# Patient Record
Sex: MALE | Race: WHITE | Employment: FULL TIME | URBAN - METROPOLITAN AREA
[De-identification: names, ages, dates, MRNs, and addresses within clinical notes are randomized per-mention and may not be internally consistent; named-entity substitution may affect disease eponyms.]

---

## 2021-07-06 ENCOUNTER — APPOINTMENT (OUTPATIENT)
Dept: GENERAL RADIOLOGY | Age: 61
DRG: 247 | End: 2021-07-06
Attending: EMERGENCY MEDICINE
Payer: COMMERCIAL

## 2021-07-06 ENCOUNTER — HOSPITAL ENCOUNTER (INPATIENT)
Age: 61
LOS: 2 days | Discharge: HOME | DRG: 247 | End: 2021-07-08
Attending: EMERGENCY MEDICINE | Admitting: FAMILY MEDICINE
Payer: COMMERCIAL

## 2021-07-06 DIAGNOSIS — I24.9 ACS (ACUTE CORONARY SYNDROME) (HCC): Primary | ICD-10-CM

## 2021-07-06 DIAGNOSIS — I21.3 ST ELEVATION (STEMI) MYOCARDIAL INFARCTION (HCC): ICD-10-CM

## 2021-07-06 DIAGNOSIS — I21.3 ST ELEVATION MYOCARDIAL INFARCTION (STEMI), UNSPECIFIED ARTERY (HCC): ICD-10-CM

## 2021-07-06 LAB
ALBUMIN SERPL-MCNC: 4.1 G/DL (ref 3.4–5)
ALBUMIN/GLOB SERPL: 1.1 {RATIO} (ref 0.8–1.7)
ALP SERPL-CCNC: 102 U/L (ref 45–117)
ALT SERPL-CCNC: 108 U/L (ref 16–61)
ANION GAP SERPL CALC-SCNC: 12 MMOL/L (ref 3–18)
APTT PPP: 27 SEC (ref 23–36.4)
AST SERPL-CCNC: 47 U/L (ref 10–38)
ATRIAL RATE: 75 BPM
BASOPHILS # BLD: 0 K/UL (ref 0–0.1)
BASOPHILS NFR BLD: 1 % (ref 0–2)
BILIRUB SERPL-MCNC: 0.4 MG/DL (ref 0.2–1)
BNP SERPL-MCNC: 47 PG/ML (ref 0–900)
BUN SERPL-MCNC: 23 MG/DL (ref 7–18)
BUN/CREAT SERPL: 16 (ref 12–20)
CALCIUM SERPL-MCNC: 9.2 MG/DL (ref 8.5–10.1)
CALCULATED P AXIS, ECG09: 6 DEGREES
CALCULATED R AXIS, ECG10: 8 DEGREES
CALCULATED T AXIS, ECG11: -49 DEGREES
CHLORIDE SERPL-SCNC: 102 MMOL/L (ref 100–111)
CK MB CFR SERPL CALC: 1.3 % (ref 0–4)
CK MB SERPL-MCNC: 2.7 NG/ML (ref 5–25)
CK SERPL-CCNC: 215 U/L (ref 39–308)
CO2 SERPL-SCNC: 20 MMOL/L (ref 21–32)
CREAT SERPL-MCNC: 1.44 MG/DL (ref 0.6–1.3)
DIAGNOSIS, 93000: NORMAL
DIFFERENTIAL METHOD BLD: NORMAL
EOSINOPHIL # BLD: 0.2 K/UL (ref 0–0.4)
EOSINOPHIL NFR BLD: 2 % (ref 0–5)
ERYTHROCYTE [DISTWIDTH] IN BLOOD BY AUTOMATED COUNT: 12.3 % (ref 11.6–14.5)
EST. AVERAGE GLUCOSE BLD GHB EST-MCNC: 151 MG/DL
GLOBULIN SER CALC-MCNC: 3.7 G/DL (ref 2–4)
GLUCOSE BLD STRIP.AUTO-MCNC: 134 MG/DL (ref 70–110)
GLUCOSE BLD STRIP.AUTO-MCNC: 136 MG/DL (ref 70–110)
GLUCOSE BLD STRIP.AUTO-MCNC: 177 MG/DL (ref 70–110)
GLUCOSE BLD STRIP.AUTO-MCNC: 198 MG/DL (ref 70–110)
GLUCOSE SERPL-MCNC: 242 MG/DL (ref 74–99)
HBA1C MFR BLD: 6.9 % (ref 4.2–5.6)
HCT VFR BLD AUTO: 42.8 % (ref 36–48)
HGB BLD-MCNC: 14.8 G/DL (ref 13–16)
INR PPP: 1 (ref 0.8–1.2)
LYMPHOCYTES # BLD: 1.6 K/UL (ref 0.9–3.6)
LYMPHOCYTES NFR BLD: 23 % (ref 21–52)
MAGNESIUM SERPL-MCNC: 2.1 MG/DL (ref 1.6–2.6)
MCH RBC QN AUTO: 31.8 PG (ref 24–34)
MCHC RBC AUTO-ENTMCNC: 34.6 G/DL (ref 31–37)
MCV RBC AUTO: 91.8 FL (ref 74–97)
MONOCYTES # BLD: 0.6 K/UL (ref 0.05–1.2)
MONOCYTES NFR BLD: 9 % (ref 3–10)
NEUTS SEG # BLD: 4.5 K/UL (ref 1.8–8)
NEUTS SEG NFR BLD: 65 % (ref 40–73)
P-R INTERVAL, ECG05: 146 MS
PLATELET # BLD AUTO: 242 K/UL (ref 135–420)
PMV BLD AUTO: 9.7 FL (ref 9.2–11.8)
POTASSIUM SERPL-SCNC: 3.9 MMOL/L (ref 3.5–5.5)
PROT SERPL-MCNC: 7.8 G/DL (ref 6.4–8.2)
PROTHROMBIN TIME: 12.7 SEC (ref 11.5–15.2)
Q-T INTERVAL, ECG07: 396 MS
QRS DURATION, ECG06: 90 MS
QTC CALCULATION (BEZET), ECG08: 442 MS
RBC # BLD AUTO: 4.66 M/UL (ref 4.35–5.65)
SODIUM SERPL-SCNC: 134 MMOL/L (ref 136–145)
TROPONIN I SERPL-MCNC: 0.1 NG/ML (ref 0–0.04)
VENTRICULAR RATE, ECG03: 75 BPM
WBC # BLD AUTO: 7 K/UL (ref 4.6–13.2)

## 2021-07-06 PROCEDURE — 92928 PRQ TCAT PLMT NTRAC ST 1 LES: CPT | Performed by: INTERNAL MEDICINE

## 2021-07-06 PROCEDURE — 96374 THER/PROPH/DIAG INJ IV PUSH: CPT

## 2021-07-06 PROCEDURE — C1894 INTRO/SHEATH, NON-LASER: HCPCS | Performed by: INTERNAL MEDICINE

## 2021-07-06 PROCEDURE — 65660000004 HC RM CVT STEPDOWN

## 2021-07-06 PROCEDURE — 74011250636 HC RX REV CODE- 250/636: Performed by: EMERGENCY MEDICINE

## 2021-07-06 PROCEDURE — C1887 CATHETER, GUIDING: HCPCS | Performed by: INTERNAL MEDICINE

## 2021-07-06 PROCEDURE — 83735 ASSAY OF MAGNESIUM: CPT

## 2021-07-06 PROCEDURE — B2111ZZ FLUOROSCOPY OF MULTIPLE CORONARY ARTERIES USING LOW OSMOLAR CONTRAST: ICD-10-PCS | Performed by: INTERNAL MEDICINE

## 2021-07-06 PROCEDURE — 2709999900 HC NON-CHARGEABLE SUPPLY

## 2021-07-06 PROCEDURE — 99223 1ST HOSP IP/OBS HIGH 75: CPT | Performed by: INTERNAL MEDICINE

## 2021-07-06 PROCEDURE — 82553 CREATINE MB FRACTION: CPT

## 2021-07-06 PROCEDURE — 85610 PROTHROMBIN TIME: CPT

## 2021-07-06 PROCEDURE — 99153 MOD SED SAME PHYS/QHP EA: CPT | Performed by: INTERNAL MEDICINE

## 2021-07-06 PROCEDURE — 4A023N7 MEASUREMENT OF CARDIAC SAMPLING AND PRESSURE, LEFT HEART, PERCUTANEOUS APPROACH: ICD-10-PCS | Performed by: INTERNAL MEDICINE

## 2021-07-06 PROCEDURE — 83880 ASSAY OF NATRIURETIC PEPTIDE: CPT

## 2021-07-06 PROCEDURE — 74011250636 HC RX REV CODE- 250/636: Performed by: INTERNAL MEDICINE

## 2021-07-06 PROCEDURE — C1769 GUIDE WIRE: HCPCS | Performed by: INTERNAL MEDICINE

## 2021-07-06 PROCEDURE — 74011000636 HC RX REV CODE- 636: Performed by: INTERNAL MEDICINE

## 2021-07-06 PROCEDURE — B2151ZZ FLUOROSCOPY OF LEFT HEART USING LOW OSMOLAR CONTRAST: ICD-10-PCS | Performed by: INTERNAL MEDICINE

## 2021-07-06 PROCEDURE — 77030013519 HC DEV INFL BASIX MRTM -B: Performed by: INTERNAL MEDICINE

## 2021-07-06 PROCEDURE — 92941 PRQ TRLML REVSC TOT OCCL AMI: CPT | Performed by: INTERNAL MEDICINE

## 2021-07-06 PROCEDURE — 83036 HEMOGLOBIN GLYCOSYLATED A1C: CPT

## 2021-07-06 PROCEDURE — 77030013797 HC KT TRNSDUC PRSSR EDWD -A: Performed by: INTERNAL MEDICINE

## 2021-07-06 PROCEDURE — 77030004522 HC CATH ANGI DX EXPO BSC -A: Performed by: INTERNAL MEDICINE

## 2021-07-06 PROCEDURE — C1725 CATH, TRANSLUMIN NON-LASER: HCPCS | Performed by: INTERNAL MEDICINE

## 2021-07-06 PROCEDURE — 85730 THROMBOPLASTIN TIME PARTIAL: CPT

## 2021-07-06 PROCEDURE — 74011636637 HC RX REV CODE- 636/637: Performed by: INTERNAL MEDICINE

## 2021-07-06 PROCEDURE — C1874 STENT, COATED/COV W/DEL SYS: HCPCS | Performed by: INTERNAL MEDICINE

## 2021-07-06 PROCEDURE — 74011000258 HC RX REV CODE- 258: Performed by: INTERNAL MEDICINE

## 2021-07-06 PROCEDURE — 74011000250 HC RX REV CODE- 250: Performed by: INTERNAL MEDICINE

## 2021-07-06 PROCEDURE — 82962 GLUCOSE BLOOD TEST: CPT

## 2021-07-06 PROCEDURE — 027034Z DILATION OF CORONARY ARTERY, ONE ARTERY WITH DRUG-ELUTING INTRALUMINAL DEVICE, PERCUTANEOUS APPROACH: ICD-10-PCS | Performed by: INTERNAL MEDICINE

## 2021-07-06 PROCEDURE — 99152 MOD SED SAME PHYS/QHP 5/>YRS: CPT | Performed by: INTERNAL MEDICINE

## 2021-07-06 PROCEDURE — 93005 ELECTROCARDIOGRAM TRACING: CPT

## 2021-07-06 PROCEDURE — 99285 EMERGENCY DEPT VISIT HI MDM: CPT

## 2021-07-06 PROCEDURE — 93458 L HRT ARTERY/VENTRICLE ANGIO: CPT | Performed by: INTERNAL MEDICINE

## 2021-07-06 PROCEDURE — 85025 COMPLETE CBC W/AUTO DIFF WBC: CPT

## 2021-07-06 PROCEDURE — 74011250637 HC RX REV CODE- 250/637: Performed by: INTERNAL MEDICINE

## 2021-07-06 PROCEDURE — 77030004558 HC CATH ANGI DX SUPR TORQ CARD -A: Performed by: INTERNAL MEDICINE

## 2021-07-06 PROCEDURE — 80053 COMPREHEN METABOLIC PANEL: CPT

## 2021-07-06 DEVICE — XIENCE SIERRA™ EVEROLIMUS ELUTING CORONARY STENT SYSTEM 3.00 MM X 15 MM / RAPID-EXCHANGE
Type: IMPLANTABLE DEVICE | Status: FUNCTIONAL
Brand: XIENCE SIERRA™

## 2021-07-06 RX ORDER — MIDAZOLAM HYDROCHLORIDE 1 MG/ML
INJECTION, SOLUTION INTRAMUSCULAR; INTRAVENOUS AS NEEDED
Status: DISCONTINUED | OUTPATIENT
Start: 2021-07-06 | End: 2021-07-06 | Stop reason: HOSPADM

## 2021-07-06 RX ORDER — DEXTROSE 50 % IN WATER (D50W) INTRAVENOUS SYRINGE
25-50 AS NEEDED
Status: DISCONTINUED | OUTPATIENT
Start: 2021-07-06 | End: 2021-07-08 | Stop reason: HOSPADM

## 2021-07-06 RX ORDER — HEPARIN SODIUM 1000 [USP'U]/ML
4000 INJECTION, SOLUTION INTRAVENOUS; SUBCUTANEOUS ONCE
Status: COMPLETED | OUTPATIENT
Start: 2021-07-06 | End: 2021-07-06

## 2021-07-06 RX ORDER — INSULIN LISPRO 100 [IU]/ML
INJECTION, SOLUTION INTRAVENOUS; SUBCUTANEOUS
Status: DISCONTINUED | OUTPATIENT
Start: 2021-07-06 | End: 2021-07-08 | Stop reason: HOSPADM

## 2021-07-06 RX ORDER — ONDANSETRON 2 MG/ML
4 INJECTION INTRAMUSCULAR; INTRAVENOUS
Status: DISCONTINUED | OUTPATIENT
Start: 2021-07-06 | End: 2021-07-08 | Stop reason: HOSPADM

## 2021-07-06 RX ORDER — PROMETHAZINE HYDROCHLORIDE 12.5 MG/1
12.5 TABLET ORAL
Status: DISCONTINUED | OUTPATIENT
Start: 2021-07-06 | End: 2021-07-08 | Stop reason: HOSPADM

## 2021-07-06 RX ORDER — SODIUM CHLORIDE 450 MG/100ML
75 INJECTION, SOLUTION INTRAVENOUS CONTINUOUS
Status: DISCONTINUED | OUTPATIENT
Start: 2021-07-06 | End: 2021-07-07

## 2021-07-06 RX ORDER — SODIUM CHLORIDE 0.9 % (FLUSH) 0.9 %
5-40 SYRINGE (ML) INJECTION EVERY 8 HOURS
Status: DISCONTINUED | OUTPATIENT
Start: 2021-07-06 | End: 2021-07-07

## 2021-07-06 RX ORDER — MORPHINE SULFATE 2 MG/ML
1 INJECTION, SOLUTION INTRAMUSCULAR; INTRAVENOUS
Status: DISCONTINUED | OUTPATIENT
Start: 2021-07-06 | End: 2021-07-08 | Stop reason: HOSPADM

## 2021-07-06 RX ORDER — ACETAMINOPHEN 325 MG/1
650 TABLET ORAL
Status: DISCONTINUED | OUTPATIENT
Start: 2021-07-06 | End: 2021-07-08 | Stop reason: HOSPADM

## 2021-07-06 RX ORDER — LIDOCAINE HYDROCHLORIDE 10 MG/ML
INJECTION, SOLUTION EPIDURAL; INFILTRATION; INTRACAUDAL; PERINEURAL AS NEEDED
Status: DISCONTINUED | OUTPATIENT
Start: 2021-07-06 | End: 2021-07-06 | Stop reason: HOSPADM

## 2021-07-06 RX ORDER — ENOXAPARIN SODIUM 100 MG/ML
40 INJECTION SUBCUTANEOUS DAILY
Status: DISCONTINUED | OUTPATIENT
Start: 2021-07-07 | End: 2021-07-07

## 2021-07-06 RX ORDER — FENTANYL CITRATE 50 UG/ML
INJECTION, SOLUTION INTRAMUSCULAR; INTRAVENOUS AS NEEDED
Status: DISCONTINUED | OUTPATIENT
Start: 2021-07-06 | End: 2021-07-06 | Stop reason: HOSPADM

## 2021-07-06 RX ORDER — ACETAMINOPHEN 650 MG/1
650 SUPPOSITORY RECTAL
Status: DISCONTINUED | OUTPATIENT
Start: 2021-07-06 | End: 2021-07-08 | Stop reason: HOSPADM

## 2021-07-06 RX ORDER — POLYETHYLENE GLYCOL 3350 17 G/17G
17 POWDER, FOR SOLUTION ORAL DAILY PRN
Status: DISCONTINUED | OUTPATIENT
Start: 2021-07-06 | End: 2021-07-08 | Stop reason: HOSPADM

## 2021-07-06 RX ORDER — ASPIRIN 81 MG/1
81 TABLET ORAL DAILY
Status: DISCONTINUED | OUTPATIENT
Start: 2021-07-07 | End: 2021-07-08 | Stop reason: HOSPADM

## 2021-07-06 RX ORDER — NITROGLYCERIN 40 MG/100ML
0-20 INJECTION INTRAVENOUS
Status: DISCONTINUED | OUTPATIENT
Start: 2021-07-06 | End: 2021-07-07

## 2021-07-06 RX ORDER — SODIUM CHLORIDE 0.9 % (FLUSH) 0.9 %
5-40 SYRINGE (ML) INJECTION AS NEEDED
Status: DISCONTINUED | OUTPATIENT
Start: 2021-07-06 | End: 2021-07-08 | Stop reason: HOSPADM

## 2021-07-06 RX ORDER — BIVALIRUDIN 250 MG/5ML
INJECTION, POWDER, LYOPHILIZED, FOR SOLUTION INTRAVENOUS AS NEEDED
Status: DISCONTINUED | OUTPATIENT
Start: 2021-07-06 | End: 2021-07-06 | Stop reason: HOSPADM

## 2021-07-06 RX ORDER — SODIUM CHLORIDE 0.9 % (FLUSH) 0.9 %
5-40 SYRINGE (ML) INJECTION EVERY 8 HOURS
Status: DISCONTINUED | OUTPATIENT
Start: 2021-07-06 | End: 2021-07-08 | Stop reason: HOSPADM

## 2021-07-06 RX ORDER — MAGNESIUM SULFATE 100 %
4 CRYSTALS MISCELLANEOUS AS NEEDED
Status: DISCONTINUED | OUTPATIENT
Start: 2021-07-06 | End: 2021-07-08 | Stop reason: HOSPADM

## 2021-07-06 RX ADMIN — SODIUM CHLORIDE 75 ML/HR: 450 INJECTION, SOLUTION INTRAVENOUS at 16:53

## 2021-07-06 RX ADMIN — Medication 10 ML: at 23:01

## 2021-07-06 RX ADMIN — HEPARIN SODIUM 4000 UNITS: 1000 INJECTION INTRAVENOUS; SUBCUTANEOUS at 09:50

## 2021-07-06 RX ADMIN — INSULIN LISPRO 2 UNITS: 100 INJECTION, SOLUTION INTRAVENOUS; SUBCUTANEOUS at 17:16

## 2021-07-06 RX ADMIN — TICAGRELOR 90 MG: 90 TABLET ORAL at 23:00

## 2021-07-06 RX ADMIN — Medication 10 ML: at 16:53

## 2021-07-06 RX ADMIN — Medication 10 ML: at 16:57

## 2021-07-06 NOTE — ED PROVIDER NOTES
EMERGENCY DEPARTMENT HISTORY AND PHYSICAL EXAM    9:22 AM      Date: 7/6/2021  Patient Name: Maritza Hebert    History of Presenting Illness     Chief Complaint   Patient presents with    Chest Pain         History Provided By: Patient  Location/Duration/Severity/Modifying factors   Patient is a 27-year-old male with a history of diabetes, hypertension, dyslipidemia, the presents emergency department complaint of crushing chest pain that began [place] . The patient was found by EMS to be diaphoretic with substernal chest pain was given nitroglycerin with improvement of 10 out of 10 pain down to 3 out of 10 pain. The patient says he is feeling better however is not felt this way in the past and never had a problem with his heart. The patient was a smoker and stopped several years ago. The patient denies any leg swelling or difficulty with exertion or having chest pain with work in the recent past.  Patient got up this morning said he did not feel great however denies any chest pain until he was starting his workday. The patient denies any other aggravating or alleviating factors. PCP: Dereck Sanders MD    Current Facility-Administered Medications   Medication Dose Route Frequency Provider Last Rate Last Admin    nitroglycerin (TRIDIL) 400 mcg/ml infusion  0-20 mcg/min IntraVENous TITRATE Ross Jovel MD           Past History     Past Medical History:  No past medical history on file. Past Surgical History:  No past surgical history on file. Family History:  No family history on file. Social History:  Social History     Tobacco Use    Smoking status: Not on file   Substance Use Topics    Alcohol use: Not on file    Drug use: Not on file       Allergies:  No Known Allergies      Review of Systems       Review of Systems   Constitutional: Positive for diaphoresis. Negative for activity change, fatigue and fever.    HENT: Negative for congestion and rhinorrhea. Eyes: Negative for visual disturbance. Respiratory: Positive for chest tightness and shortness of breath. Cardiovascular: Positive for chest pain. Negative for palpitations. Gastrointestinal: Negative for abdominal pain, diarrhea, nausea and vomiting. Genitourinary: Negative for dysuria and hematuria. Musculoskeletal: Negative for back pain. Skin: Negative for rash. Neurological: Negative for dizziness, weakness and light-headedness. All other systems reviewed and are negative. Physical Exam   There were no vitals taken for this visit. Physical Exam  Vitals and nursing note reviewed. Constitutional:       General: He is not in acute distress. Appearance: He is well-developed. Comments: Elevated BMI   HENT:      Head: Normocephalic and atraumatic. Right Ear: External ear normal.      Left Ear: External ear normal.      Nose: Nose normal.   Eyes:      General: No scleral icterus. Conjunctiva/sclera: Conjunctivae normal.      Pupils: Pupils are equal, round, and reactive to light. Neck:      Thyroid: No thyromegaly. Vascular: No JVD. Trachea: No tracheal deviation. Cardiovascular:      Rate and Rhythm: Normal rate and regular rhythm. Heart sounds: Normal heart sounds. No murmur heard. No friction rub. No gallop. Pulmonary:      Effort: Pulmonary effort is normal.      Breath sounds: Normal breath sounds. Chest:      Chest wall: No tenderness. Abdominal:      General: Bowel sounds are normal. There is no distension. Palpations: Abdomen is soft. Tenderness: There is no abdominal tenderness. There is no guarding or rebound. Musculoskeletal:         General: No tenderness. Normal range of motion. Cervical back: Normal range of motion and neck supple. Lymphadenopathy:      Cervical: No cervical adenopathy. Skin:     General: Skin is warm and dry.    Neurological:      Mental Status: He is alert and oriented to person, place, and time. Cranial Nerves: No cranial nerve deficit. Coordination: Coordination normal.      Comments: No sensory loss, Gait normal, Motor 5/5   Psychiatric:      Comments: Good insight to care           Diagnostic Study Results     Labs -  Recent Results (from the past 12 hour(s))   EKG, 12 LEAD, INITIAL    Collection Time: 07/06/21  9:19 AM   Result Value Ref Range    Ventricular Rate 75 BPM    Atrial Rate 75 BPM    P-R Interval 146 ms    QRS Duration 90 ms    Q-T Interval 396 ms    QTC Calculation (Bezet) 442 ms    Calculated P Axis 6 degrees    Calculated R Axis 8 degrees    Calculated T Axis -49 degrees    Diagnosis       Normal sinus rhythm  Inferior infarct , age undetermined  Abnormal ECG  No previous ECGs available     CBC WITH AUTOMATED DIFF    Collection Time: 07/06/21  9:25 AM   Result Value Ref Range    WBC 7.0 4.6 - 13.2 K/uL    RBC 4.66 4.35 - 5.65 M/uL    HGB 14.8 13.0 - 16.0 g/dL    HCT 42.8 36.0 - 48.0 %    MCV 91.8 74.0 - 97.0 FL    MCH 31.8 24.0 - 34.0 PG    MCHC 34.6 31.0 - 37.0 g/dL    RDW 12.3 11.6 - 14.5 %    PLATELET 141 270 - 604 K/uL    MPV 9.7 9.2 - 11.8 FL    NEUTROPHILS 65 40 - 73 %    LYMPHOCYTES 23 21 - 52 %    MONOCYTES 9 3 - 10 %    EOSINOPHILS 2 0 - 5 %    BASOPHILS 1 0 - 2 %    ABS. NEUTROPHILS 4.5 1.8 - 8.0 K/UL    ABS. LYMPHOCYTES 1.6 0.9 - 3.6 K/UL    ABS. MONOCYTES 0.6 0.05 - 1.2 K/UL    ABS. EOSINOPHILS 0.2 0.0 - 0.4 K/UL    ABS.  BASOPHILS 0.0 0.0 - 0.1 K/UL    DF AUTOMATED     METABOLIC PANEL, COMPREHENSIVE    Collection Time: 07/06/21  9:25 AM   Result Value Ref Range    Sodium 134 (L) 136 - 145 mmol/L    Potassium 3.9 3.5 - 5.5 mmol/L    Chloride 102 100 - 111 mmol/L    CO2 20 (L) 21 - 32 mmol/L    Anion gap 12 3.0 - 18 mmol/L    Glucose 242 (H) 74 - 99 mg/dL    BUN 23 (H) 7.0 - 18 MG/DL    Creatinine 1.44 (H) 0.6 - 1.3 MG/DL    BUN/Creatinine ratio 16 12 - 20      GFR est AA >60 >60 ml/min/1.73m2    GFR est non-AA 50 (L) >60 ml/min/1.73m2    Calcium 9.2 8.5 - 10.1 MG/DL    Bilirubin, total 0.4 0.2 - 1.0 MG/DL    ALT (SGPT) 108 (H) 16 - 61 U/L    AST (SGOT) 47 (H) 10 - 38 U/L    Alk. phosphatase 102 45 - 117 U/L    Protein, total 7.8 6.4 - 8.2 g/dL    Albumin 4.1 3.4 - 5.0 g/dL    Globulin 3.7 2.0 - 4.0 g/dL    A-G Ratio 1.1 0.8 - 1.7     MAGNESIUM    Collection Time: 07/06/21  9:25 AM   Result Value Ref Range    Magnesium 2.1 1.6 - 2.6 mg/dL   PTT    Collection Time: 07/06/21  9:25 AM   Result Value Ref Range    aPTT 27.0 23.0 - 36.4 SEC   PROTHROMBIN TIME + INR    Collection Time: 07/06/21  9:25 AM   Result Value Ref Range    Prothrombin time 12.7 11.5 - 15.2 sec    INR 1.0 0.8 - 1.2     NT-PRO BNP    Collection Time: 07/06/21  9:25 AM   Result Value Ref Range    NT pro-BNP 47 0 - 900 PG/ML   CARDIAC PANEL,(CK, CKMB & TROPONIN)    Collection Time: 07/06/21  9:25 AM   Result Value Ref Range    CK - MB 2.7 <3.6 ng/ml    CK-MB Index 1.3 0.0 - 4.0 %     39 - 308 U/L    Troponin-I, QT 0.10 (H) 0.0 - 0.045 NG/ML       Radiologic Studies -   XR CHEST SNGL V    (Results Pending)         Medical Decision Making   I am the first provider for this patient. I reviewed the vital signs, available nursing notes, past medical history, past surgical history, family history and social history. Vital Signs-Reviewed the patient's vital signs. EKG: Prehospital EKG #1 a 9:02 AM, 1/2 mm ST elevations in 2 3 and aVF, reciprocal change 1 and aVL, chest pain improving no STEMI activated  prehospital EKG #2 T wave inversions inferiorly, no longer has ST elevations, will repeat  ED EKG #1 normal sinus rhythm at 75, T wave inversions inferiorly, dynamic change from prehospital EKG    Records Reviewed: Nursing Notes, Old Medical Records, Previous Radiology Studies and Previous Laboratory Studies (Time of Review: 9:22 AM)    ED Course: Progress Notes, Reevaluation, and Consults:      The patient was seen on arrival and prehospital EKG has suggestion of inferior ST elevations and the patient had concerning chest pain that is improving after nitroglycerin. The patient was given 1 nitroglycerin and has developed less ST elevations now T wave inversions but given his risk factors and dynamic EKG changes cardiology was called and will initiate nitroglycerin and heparin as have a low suspicion for bleeding or dissection at this time. Cara Aleman DO 9:25 AM    Discussed case with Dr. Carlo Maxwell will come to the ED to evaluate the patient. Cara Aleman DO 9:25 AM    The case was discussed with Dr. Maida Thomas and will admit to CVT bed. Cara Aleman DO 10:06 AM      Provider Notes (Medical Decision Making):   MDM  Number of Diagnoses or Management Options  Diagnosis management comments: Patient is a 29-year-old male with a history of hypertension, diabetes, dyslipidemia, and former smoking who presents emergency department complaint of chest pressure that began while at work and improved with nitroglycerin. The patient was given aspirin by EMS and initial EKG sent by EMS showed some suggestion of ST elevation inferiorly and given his improvement he was evaluated on arrival and had a repeat EMS EKG that showed resolution of ST elevation that was subtle and the presence of T wave inversions. The patient's chest pain is now 1 out of 10 and ED EKG shows inferior T wave inversions and resolution of any ST elevation. The patient has dynamic EKG changes cardiology's been consulted and recommends giving the patient heparin and starting a nitroglycerin return drip and will come to evaluate the patient. The patient has no pain into his arms and not radiating to his back and have a low suspicion for aortic dissection at this time. We will follow the patient closely and repeat his EKGs if he has any changes in his pain.  Cara Aleman DO 9:29 AM        Procedures    Critical Care Time: Critical Care Time:  The services I provided to this patient were to treat and/or prevent clinically significant deterioration that could result in the failure of one or more body systems and/or organ systems due to acute coronary syndrome with dynamic EKG changes and chest pain  Services included the following:  -reviewing nursing notes and old charts  -vital sign assessments  -direct patient care  -medication orders and management  -interpreting and reviewing diagnostic studies/labs  -re-evaluations  -documentation time    Aggregate critical care time was 32 minutes, which includes only time during which I was engaged in work directly related to the patient's care as described above, whether I was at bedside or elsewhere in the Emergency Department. It did not include time spent performing other reported procedures or the services of residents, students, nurses, or advance practice providers. Misty Long DO 10:06 AM      Diagnosis     Clinical Impression:   1. ACS (acute coronary syndrome) (HCC)            Disposition: Admit     Follow-up Information    None          Current Discharge Medication List      CONTINUE these medications which have NOT CHANGED    Details   amLODIPine (NORVASC) 5 mg tablet Take 5 mg by mouth daily. lisinopril (PRINIVIL, ZESTRIL) 40 mg tablet Take 40 mg by mouth daily. atorvastatin (LIPITOR) 40 mg tablet Take 40 mg by mouth daily. glimepiride (AMARYL) 4 mg tablet Take 4 mg by mouth two (2) times a day. empagliflozin (JARDIANCE) 25 mg tablet Take 25 mg by mouth daily. metFORMIN (GLUCOPHAGE) 500 mg tablet Take 500 mg by mouth daily (with dinner). sAXagliptin (ONGLYZA) 5 mg tab tablet Take 5 mg by mouth daily. colchicine (MITIGARE) 0.6 mg capsule Take 0.6 mg by mouth daily. aspirin delayed-release 81 mg tablet Take 81 mg by mouth daily. Disclaimer: Sections of this note are dictated using utilizing voice recognition software. Minor typographical errors may be present.  If questions arise, please do not hesitate to contact me or call our department.

## 2021-07-06 NOTE — PROGRESS NOTES
Procedure note (preliminary)      Left heart catheterization, bilateral coronary arteriography, ventriculography, PCI of 95% mid  RCA stenosis    Findings; Left main trunk patent    LAD; minimal luminal irregularities    Circumflex; normal irregularities    RCA; 95% regular mid vessel stenosis    Patient had percutaneous intervention of the right coronary artery with a 0.0 15 Xience stent. 95% lesion reduced to 0%. CHAIM 3 flow. Patient tolerated procedure well.     Kiersten Izquierdo MD

## 2021-07-06 NOTE — H&P
History & Physical    Patient: Carmen lFood MRN: [id number]  CSN: [id number]    YOB: 1960  Age: 62 y.o. Sex: male      DOA: 7/6/2021  CC: chest pain at work    PCP: Samina Flor MD       HPI:     Carmen Flood is a 62 y.o. male with medical co-morbidities including HTN, dyslipidemia, type 2 DM, remote h/o tobacco smoking, obesity, presented from work after he had chest pain with associated sweating. He woke up this morning and did not feel well but he went to work where his symptom exacerbated. He stated chest pain was at mid chest 10 out of 10. Non radiate. No associate shortness of breath. No nausea/vomiting. EMS found him with sweating and EKG with elevated ST segment. CODE STEMI called in the ER. He was taken to cardiac cath lab with left heart cath to find PCI of 95% mid RCA stenosis. Stent placement and he tolerated well. Currently, in post cath lab without chest pain. No shortness of breath. Review of Systems  GENERAL: No fever, No chill, No malaise   HEENT: No change in vision, no ear ache, no sore throat or sinus congestion. NECK: No pain or stiffness. PULMONARY: No shortness of breath, no cough or wheeze. Cardiovascular: no pnd / orthopnea, + Chest Pain  GASTROINTESTINAL: No abd pain, No nausea/vomiting, No diarrhea, No bright red blood per rectum. GENITOURINARY: No urinary frequency, No urgency or pain with urination. MUSCULOSKELETAL: No joint or muscle pain, no back pain, no recent trauma. DERMATOLOGIC: No rash, no itching, no lesions. ENDOCRINE: No polyuria, polydipsia, NO heat or cold intolerance. No recent change in weight. HEMATOLOGICAL: No easy bruising or bleeding. NEUROLOGIC: No headache, No seizures, No generalized weakness         No past medical history on file. 1) Hypertension   2)  Dyslipidemia   3)  Type 2 DM     No past surgical history on file. Non contributory     No family history on file.    1) Father has heart disease   2) His uncle has heart disease     Social History     Socioeconomic History    Marital status:      Spouse name: Not on file    Number of children: Not on file    Years of education: Not on file    Highest education level: Not on file     Social Determinants of Health     Financial Resource Strain:     Difficulty of Paying Living Expenses:    Food Insecurity:     Worried About Running Out of Food in the Last Year:     [address] in the Last Year:    Transportation Needs:     Lack of Transportation (Medical):  Lack of Transportation (Non-Medical):    Physical Activity:     Days of Exercise per Week:     Minutes of Exercise per Session:    Stress:     Feeling of Stress :    Social Connections:     Frequency of Communication with Friends and Family:     Frequency of Social Gatherings with Friends and Family:     Attends Buddhist Services:     Active Member of Clubs or Organizations:     Attends Club or Organization Meetings:     Marital Status:        Prior to Admission medications    Medication Sig Start Date End Date Taking? Authorizing Provider   amLODIPine (NORVASC) 5 mg tablet Take 5 mg by mouth daily. Yes Provider, Historical   lisinopril (PRINIVIL, ZESTRIL) 40 mg tablet Take 40 mg by mouth daily. Yes Provider, Historical   atorvastatin (LIPITOR) 40 mg tablet Take 40 mg by mouth daily. Yes Provider, Historical   glimepiride (AMARYL) 4 mg tablet Take 4 mg by mouth two (2) times a day. Yes Provider, Historical   empagliflozin (Jardiance) 25 mg tablet Take 25 mg by mouth daily. Yes Provider, Historical   metFORMIN (GLUCOPHAGE) 500 mg tablet Take 500 mg by mouth daily (with dinner). Yes Provider, Historical   sAXagliptin (ONGLYZA) 5 mg tab tablet Take 5 mg by mouth daily. Yes Provider, Historical   colchicine (MITIGARE) 0.6 mg capsule Take 0.6 mg by mouth daily. Yes Provider, Historical   aspirin delayed-release 81 mg tablet Take 81 mg by mouth daily.    Yes Provider, Historical       No Known Allergies           Physical Exam:      Visit Vitals  /71 (BP 1 Location: Left upper arm, BP Patient Position: Supine)   Pulse 64   SpO2 99%       Physical Exam:  Tele: sinus   General:  Cooperative, Not in acute distress, speaks in full sentence while in bed  HEENT: PERRL, EOMI, supple neck, no JVD, dry oral mucosa  Cardiovascular: S1S2 regular, no rub/gallop   Pulmonary: air entry bilaterally, no wheezing, + crackle  GI:  Soft, non tender, non distended, +bs, no guarding   Extremities:  + pedal edema, +distal pulses appreciated   Neuro: AOx3, moving all extremities, no gross deficit. Lab/Data Review:  Labs: Results:       Chemistry Recent Labs     07/06/21  0925   *   *   K 3.9      CO2 20*   BUN 23*   CREA 1.44*   CA 9.2   AGAP 12   BUCR 16      TP 7.8   ALB 4.1   GLOB 3.7   AGRAT 1.1      CBC w/Diff Recent Labs     07/06/21  0925   WBC 7.0   RBC 4.66   HGB 14.8   HCT 42.8      GRANS 65   LYMPH 23   EOS 2      Coagulation Recent Labs     07/06/21  0925   PTP 12.7   INR 1.0   APTT 27.0       Iron/Ferritin No results for input(s): IRON in the last 72 hours. No lab exists for component: TIBCCALC   BNP No results for input(s): BNPP in the last 72 hours.    Cardiac Enzymes Recent Labs     07/06/21  0925      CKND1 1.3      Liver Enzymes Recent Labs     07/06/21  0925   TP 7.8   ALB 4.1         Thyroid Studies No results found for: T4, T3U, TSH, TSHEXT       All Micro Results     None          Imaging Reviewed:  EKG Results     Procedure 720 Value Units Date/Time    EKG, 12 LEAD, INITIAL [[id number]] Collected: 07/06/21 0919    Order Status: Completed Updated: 07/06/21 0920     Ventricular Rate 75 BPM      Atrial Rate 75 BPM      P-R Interval 146 ms      QRS Duration 90 ms      Q-T Interval 396 ms      QTC Calculation (Bezet) 442 ms      Calculated P Axis 6 degrees      Calculated R Axis 8 degrees      Calculated T Axis -49 degrees Diagnosis --     Normal sinus rhythm  Inferior infarct , age undetermined  Abnormal ECG  No previous ECGs available      EKG, 12 LEAD, SUBSEQUENT [[id number]]     Order Status: Canceled               Assessment:   Principal Problem:    1)  ACS (acute coronary syndrome), NSTEMI, elevated troponin   2)  CAD s/p stent to RCA   3)  Hypertension   4)  Dyslipidemia   5)  Type 2 DM with hyperglycemia   6)   Remote history of heavy tobacco smoking   7)   MAINOR, likely prerenal pathology   8)   Hyponatremia       Plan:     Admitted to stepdown unit post cardiac cath. Close monitor per post cath protocol   ASA and Brilinta ordered by cardiology. Hold off his lisinopril for now.  Will add betablocker for BP control   Increase his statin to high risk patient   ICS   Goal systolic TO<441  Echo   Check US retroperitoneum  Cardiac diet  He needs further education on diabetic care and nutritional support at home       Risk of deterioration:  []Low    [x]Moderate  []High     Prophylaxis:  [x]Lovenox  []Coumadin  []Hep SQ  []SCDs  [x]H2B/PPI     Disposition:  []Home w/ Family   [x] PT,OT,RN   []SNF/LTC   []SAH/Rehab     Discussed Code Status:         [x]Full Code      []DNR         ___________________________________________________     Care Plan discussed with:    [x]Patient   [x]Family    []ED Care Manager  [x]ED Doc   [x]Specialist :  Total Time Coordinating Admission:  70    minutes    []Total Critical Care Time:       Aziza Martinez MD  7/6/2021, 1:14 PM

## 2021-07-06 NOTE — Clinical Note
Lesion: Located in the Mid RCA. Stent inserted. Stent deployed. Single technique used. First inflation pressure = 15 mai; inflation time: 33 sec.

## 2021-07-06 NOTE — Clinical Note
Lesion located in the Mid RCA. Balloon inserted. Balloon inflated using multiple inflation technique. Lesion #1: Pressure = 12 mai; Duration = 30 sec. Inflation 2: Pressure = 12 mai; Duration = 30 sec.

## 2021-07-06 NOTE — Clinical Note
Contrast Dose Calculator:   Patient's age: 62.   Patient's sex: Male. Patient weight (kg) = 102. Creatinine level (mg/dL) = 1.44. Creatinine clearance (mL/min): 79.69. Max Contrast dose per Creatinine Cl calculator = 179.3 mL.

## 2021-07-06 NOTE — Clinical Note
Patient has had no further bleeding from left nares, ED MD at bedside will monitor TRANSFER - IN REPORT:     Verbal report received from: Akira Monroe ED RN. Report consisted of patient's Situation, Background, Assessment and   Recommendations(SBAR). Opportunity for questions and clarification was provided. Assessment completed upon patient's arrival to unit and care assumed. Patient transported with a Cardiac Cath Tech / Patient Care Tech, Monitor and Oxygen. Oxygen used for patient = nasal cannula.

## 2021-07-06 NOTE — PROGRESS NOTES
1300: SHEATH PULL NOTE:    Patient informed of procedure with questions answered with review. Sheath site 6 fr sheath in Right groin pulled by CHIRAG Stark. Hand hold with manual compression to site. Hematoma noted and second RN assist with manual expression of hematoma. Handhold for 20 minutes. Tegaderm and hemostatic dressing applied to site. No bleeding, no hematoma, no pain/discomfort at site post-20 min hold. Groin instructions provided with review. Continue to monitor procedure site and patient status. *Advised patient to keep head down and lie extremity flat to decrease risk of bleeding. *Instructed patient on risk for/prevention of bleeding. Patient verbalized understanding of instructions with review. 1430: TRANSFER - OUT REPORT:    Verbal report given to Jimena Friedman RN (name) on Gladystine Mortimer  being transferred to (unit) for routine progression of care       Report consisted of patients Situation, Background, Assessment and   Recommendations(SBAR). Information from the following report(s) SBAR, Kardex, Procedure Summary, Intake/Output, MAR and Recent Results was reviewed with the receiving nurse.     Lines:   Peripheral IV 07/06/21 Right Antecubital (Active)   Site Assessment Clean, dry, & intact 07/06/21 0925   Phlebitis Assessment 0 07/06/21 0925   Infiltration Assessment 0 07/06/21 0925   Dressing Status Clean, dry, & intact 07/06/21 0925   Dressing Type Transparent 07/06/21 0925   Hub Color/Line Status Pink;Flushed;Patent 07/06/21 0925   Action Taken Blood drawn 07/06/21 0925   Alcohol Cap Used No 07/06/21 0925       Peripheral IV 07/06/21 Left Forearm (Active)   Site Assessment Clean, dry, & intact 07/06/21 0950   Phlebitis Assessment 0 07/06/21 0950   Infiltration Assessment 0 07/06/21 0950   Dressing Status Clean, dry, & intact 07/06/21 0950   Dressing Type Transparent 07/06/21 0950   Hub Color/Line Status Green;Flushed;Patent 07/06/21 0950   Alcohol Cap Used No 07/06/21 0950 Opportunity for questions and clarification was provided.       Patient transported with:   Registered Nurse

## 2021-07-06 NOTE — PROGRESS NOTES
Bedside turnover given from [employer]. CHRISTIAN,MAR,ED summary given with updates R/T the night, a chance to ask questions was given. PT is on the cardiac tele monitor in stable condition. Bed is in the lowest position with the wheels locked. Call bell and personal effects  are on the bedside table within reach. Double checked with RN coming on the IV drips. Patient resting comfortably. Whiteboard updated.

## 2021-07-06 NOTE — CONSULTS
Cardiology Consult Note    Consultation request by No admitting provider for patient encounter. for advice/opinion related to evaluating CP    Date of  Admission: 7/6/2021  9:40 AM   Primary Care Physician:  Britt Dobbins MD    Consulting Cardiologist: Dr. Ayesha Novoa  Patient seen and examined independently in the ED. Patient has had episodic chest discomfort for several weeks. Became worse this morning with associated diaphoresis. Initial EKG showed mild inferior ST with follow-up ECG demonstrating primarily T wave inversion in the inferior leads. Discussed subsequent care with the patient at length. Plan is for emergency cardiac catheterization and possible catheter intervention. Agree with assessment and plan as noted below. Jero Hickman MD   Assessment:     -Unstable angina. Presented with stuttering CP for 2-3 weeks. Worse this AM with associated diaphoresis and dizziness. ECG with inferior ST wave abnormalities. Troponin pending. CP improved with SLN and ASA but not yet resolved. -Hypertension.  -Diabetes mellitus.  -Dyslipidemia. -H/o remote tobacco use. -H/o family history of CAD, unclear specifics. No primary cardiologist.     Plan: Will proceed with cardiac catheterization. Risks, benefits, indications, alternatives have been discussed with patient. He understands and agrees to proceed. Patient had SLN and ASA en route. Patient had 4000 units heparin. Labs are pending at this time. History of Present Illness: This is a 62 y.o. male admitted for No admission diagnoses are documented for this encounter. .      Patient complains of: CP. Patient reports over the past few weeks he has had stuttering CP. He though it was secondary to Covid vaccine as he felt the symptoms started after first vaccine and then returned after second vaccine.  Patient reports he has substernal chest pressure sometimes worse with activity but sometimes occurring at rest. Symptoms usually resolve after a few minutes. Patient reports mild GIBBONS. Patient reports while driving to work at [employer] where he is a  he had a severe episode. He felt dizzy. He felt diaphoretic. He had SOB. When he got to work, EMS was called. Patient was treated with ASA and SLN and pain is improving but not yet resolved. Patient denies previous cardiac history. He reports his PCP checks ECG occasionally for twinges of chest pain he has had in past but denies previous ischemic work up. Patient reports dad has heart disease but does not known specifics. Patient reports he quit smoking a few years ago. Cardiac risk factors: smoking/ tobacco exposure, family history, dyslipidemia, diabetes mellitus, hypertension      Review of Symptoms:  Except as stated above include:  Constitutional:  negative  Respiratory:  negative  Cardiovascular:  C/o CP, SOB  Gastrointestinal: negative  Genitourinary:  negative  Musculoskeletal:  Negative  Neurological:  Negative  Dermatological:  Negative  Endocrinological: Negative  Psychological:  Negative       Past Medical History:   No past medical history on file. Social History:     Social History     Socioeconomic History    Marital status:      Spouse name: Not on file    Number of children: Not on file    Years of education: Not on file    Highest education level: Not on file     Social Determinants of Health     Financial Resource Strain:     Difficulty of Paying Living Expenses:    Food Insecurity:     Worried About Running Out of Food in the Last Year:     [address] in the Last Year:    Transportation Needs:     Lack of Transportation (Medical):      Lack of Transportation (Non-Medical):    Physical Activity:     Days of Exercise per Week:     Minutes of Exercise per Session:    Stress:     Feeling of Stress :    Social Connections:     Frequency of Communication with Friends and Family:     Frequency of Social Gatherings with Friends and Family:     Attends Taoism Services:     Active Member of Clubs or Organizations:     Attends Club or Organization Meetings:     Marital Status:         Family History:   No family history on file. Medications:   No Known Allergies     Current Facility-Administered Medications   Medication Dose Route Frequency    nitroglycerin (TRIDIL) 400 mcg/ml infusion  0-20 mcg/min IntraVENous TITRATE    heparin (porcine) 1,000 unit/mL injection 4,000 Units  4,000 Units IntraVENous ONCE     Current Outpatient Medications   Medication Sig    amLODIPine (NORVASC) 5 mg tablet Take 5 mg by mouth daily.  lisinopril (PRINIVIL, ZESTRIL) 40 mg tablet Take 40 mg by mouth daily.  atorvastatin (LIPITOR) 40 mg tablet Take 40 mg by mouth daily.  glimepiride (AMARYL) 4 mg tablet Take 4 mg by mouth two (2) times a day.  empagliflozin (JARDIANCE) 25 mg tablet Take 25 mg by mouth daily.  metFORMIN (GLUCOPHAGE) 500 mg tablet Take 500 mg by mouth daily (with dinner).  sAXagliptin (ONGLYZA) 5 mg tab tablet Take 5 mg by mouth daily.  colchicine (MITIGARE) 0.6 mg capsule Take 0.6 mg by mouth daily.  aspirin delayed-release 81 mg tablet Take 81 mg by mouth daily. Physical Exam:   There were no vitals taken for this visit. TELE: Not on monitor. BP Readings from Last 3 Encounters:   11/23/18 113/67     Pulse Readings from Last 3 Encounters:   11/23/18 82     Wt Readings from Last 3 Encounters:   11/23/18 210 lb (95.3 kg)       General:  mild distress, appears stated age  Neck:  no JVD  Lungs:  clear to auscultation bilaterally  Heart:  regular rate and rhythm  Abdomen:  abdomen is soft without significant tenderness, masses, organomegaly or guarding  Extremities:  extremities normal, atraumatic, no cyanosis or edema  Skin: Warm and dry.  no hyperpigmentation, vitiligo, or suspicious lesions  Neuro: alert, oriented x3, affect appropriate  Psych: non focal     Data Review:     No results for input(s): WBC, HGB, HCT, PLT, HGBEXT, HCTEXT, PLTEXT in the last 72 hours. No results for input(s): NA, K, CL, CO2, GLU, BUN, CREA, CA, MG, PHOS, ALB, TBIL, ALT, INR, INREXT in the last 72 hours. No lab exists for component: SGOT,  BNP    Results for orders placed or performed during the hospital encounter of 07/06/21   EKG, 12 LEAD, INITIAL   Result Value Ref Range    Ventricular Rate 75 BPM    Atrial Rate 75 BPM    P-R Interval 146 ms    QRS Duration 90 ms    Q-T Interval 396 ms    QTC Calculation (Bezet) 442 ms    Calculated P Axis 6 degrees    Calculated R Axis 8 degrees    Calculated T Axis -49 degrees    Diagnosis       Normal sinus rhythm  Inferior infarct , age undetermined  Abnormal ECG  No previous ECGs available         All Cardiac Markers in the last 24 hours:  No results found for: CPK, CK, CKMMB, CKMB, RCK3, CKMBT, CKNDX, CKND1, BETTINA, TROPT, TROIQ, CHANDU, TROPT, TNIPOC, BNP, BNPP    Last Lipid:  No results found for: CHOL, CHOLX, CHLST, CHOLV, HDL, HDLP, LDL, LDLC, DLDLP, TGLX, TRIGL, TRIGP, CHHD, CHHDX    Cardiographics:     EKG Results     Procedure 720 Value Units Date/Time    EKG, 12 LEAD, INITIAL [[id number]] Collected: 07/06/21 0919    Order Status: Completed Updated: 07/06/21 0920     Ventricular Rate 75 BPM      Atrial Rate 75 BPM      P-R Interval 146 ms      QRS Duration 90 ms      Q-T Interval 396 ms      QTC Calculation (Bezet) 442 ms      Calculated P Axis 6 degrees      Calculated R Axis 8 degrees      Calculated T Axis -49 degrees      Diagnosis --     Normal sinus rhythm  Inferior infarct , age undetermined  Abnormal ECG  No previous ECGs available      EKG, 12 LEAD, SUBSEQUENT [[id number]]     Order Status: Sent                   XR Results (most recent):  Results from Hospital Encounter encounter on 11/23/18    XR CHEST SNGL V    Narrative  EXAM:  Chest AP    INDICATIONS: Right thoracentesis    COMPARISON: 11/23/2018 at 11:57 AM    FINDINGS:    No pneumothorax.  Small moderate right layering pleural effusion, unchanged. Normal cardiomediastinal contours. Impression  IMPRESSION:  No pneumothorax post right thoracentesis. Unchanged effusion.         Signed By: DOMINIK Martini     July 6, 2021

## 2021-07-06 NOTE — ROUTINE PROCESS
1530 TRANSFER - IN REPORT:    Verbal report received from Mi (name) on Mariana Gip  being received from [facility](unit) for routine progression of care      Report consisted of patients Situation, Background, Assessment and   Recommendations(SBAR). Information from the following report(s) SBAR, MAR, Recent Results and Cardiac Rhythm NSR was reviewed with the receiving nurse. Opportunity for questions and clarification was provided. Assessment completed upon patients arrival to unit and care assumed. Right groin dressing dry and intact. Pulses good. 1700 Insulin given for . IVF started. Patient denies any pain. Call bell and urinal and telephone placed within reach.

## 2021-07-06 NOTE — PROGRESS NOTES
TRANSFER - IN REPORT:    Verbal report received from Santi Hancock RCIS(name) on Scott Souza  being received from [facility](unit) for routine post - op      Report consisted of patients Situation, Background, Assessment and   Recommendations(SBAR). Information from the following report(s) SBAR, Procedure Summary and MAR was reviewed with the receiving nurse. Opportunity for questions and clarification was provided. Assessment completed upon patients arrival to unit and care assumed.

## 2021-07-06 NOTE — Clinical Note
Lesion located in the Mid RCA. Balloon inserted. Balloon inflated using single inflation technique. Lesion #1: Pressure = 13 mai; Duration = 27 sec.

## 2021-07-06 NOTE — Clinical Note
TRANSFER - OUT REPORT:     Verbal report given to: CHIRAG Stark. Report consisted of patient's Situation, Background, Assessment and   Recommendations(SBAR). Opportunity for questions and clarification was provided. Patient transported with a Cardiac Cath Tech / Patient Care Tech. Patient transported to: holding area.

## 2021-07-06 NOTE — LETTER
NOTIFICATION RETURN TO WORK / SCHOOL    7/8/2021 10:50 AM    Mr. Kavon Miller  [address]      To Whom It May Concern:    Kavon Miller is currently under the care of [facility]. He will return to work/school on:7/26/2021. He is advised to remain on light duty for 2 weeks starting 7/26/2021    If there are questions or concerns please have the patient contact our office.         Sincerely,      Wesley Kohler MD

## 2021-07-07 ENCOUNTER — APPOINTMENT (OUTPATIENT)
Dept: NON INVASIVE DIAGNOSTICS | Age: 61
DRG: 247 | End: 2021-07-07
Attending: INTERNAL MEDICINE
Payer: COMMERCIAL

## 2021-07-07 LAB
ANION GAP SERPL CALC-SCNC: 6 MMOL/L (ref 3–18)
ATRIAL RATE: 64 BPM
BUN SERPL-MCNC: 20 MG/DL (ref 7–18)
BUN/CREAT SERPL: 21 (ref 12–20)
CALCIUM SERPL-MCNC: 8.7 MG/DL (ref 8.5–10.1)
CALCULATED P AXIS, ECG09: 57 DEGREES
CALCULATED R AXIS, ECG10: -3 DEGREES
CALCULATED T AXIS, ECG11: 14 DEGREES
CHLORIDE SERPL-SCNC: 107 MMOL/L (ref 100–111)
CO2 SERPL-SCNC: 25 MMOL/L (ref 21–32)
CREAT SERPL-MCNC: 0.97 MG/DL (ref 0.6–1.3)
DIAGNOSIS, 93000: NORMAL
ECHO AO ASC DIAM: 3.38 CM
ECHO AO ROOT DIAM: 3.72 CM
ECHO LA AREA 4C: 17.26 CM2
ECHO LA VOL 2C: 31.21 ML (ref 18–58)
ECHO LA VOL 4C: 50.68 ML (ref 18–58)
ECHO LA VOL BP: 43.98 ML (ref 18–58)
ECHO LA VOL/BSA BIPLANE: 20.46 ML/M2 (ref 16–28)
ECHO LA VOLUME INDEX A2C: 14.52 ML/M2 (ref 16–28)
ECHO LA VOLUME INDEX A4C: 23.57 ML/M2 (ref 16–28)
ECHO LV INTERNAL DIMENSION DIASTOLIC: 4.22 CM (ref 4.2–5.9)
ECHO LV INTERNAL DIMENSION SYSTOLIC: 2.84 CM
ECHO LV IVSD: 1.03 CM (ref 0.6–1)
ECHO LV MASS 2D: 174 G (ref 88–224)
ECHO LV MASS INDEX 2D: 80.9 G/M2 (ref 49–115)
ECHO LV POSTERIOR WALL DIASTOLIC: 1.32 CM (ref 0.6–1)
ECHO LVOT DIAM: 2 CM
ECHO LVOT PEAK GRADIENT: 4.34 MMHG
ECHO LVOT PEAK VELOCITY: 104.11 CM/S
ECHO LVOT SV: 67.4 ML
ECHO LVOT VTI: 21.53 CM
ECHO MV A VELOCITY: 119.35 CM/S
ECHO MV E DECELERATION TIME (DT): 254.97 MS
ECHO MV E VELOCITY: 71.79 CM/S
ECHO MV E/A RATIO: 0.6
ERYTHROCYTE [DISTWIDTH] IN BLOOD BY AUTOMATED COUNT: 12.6 % (ref 11.6–14.5)
GLUCOSE BLD STRIP.AUTO-MCNC: 129 MG/DL (ref 70–110)
GLUCOSE BLD STRIP.AUTO-MCNC: 146 MG/DL (ref 70–110)
GLUCOSE BLD STRIP.AUTO-MCNC: 150 MG/DL (ref 70–110)
GLUCOSE BLD STRIP.AUTO-MCNC: 179 MG/DL (ref 70–110)
GLUCOSE SERPL-MCNC: 138 MG/DL (ref 74–99)
HCT VFR BLD AUTO: 42.2 % (ref 36–48)
HCT VFR BLD AUTO: 42.5 % (ref 36–48)
HGB BLD-MCNC: 14.4 G/DL (ref 13–16)
HGB BLD-MCNC: 14.7 G/DL (ref 13–16)
LVOT MG: 2.28 MMHG
MAGNESIUM SERPL-MCNC: 2.1 MG/DL (ref 1.6–2.6)
MCH RBC QN AUTO: 32.1 PG (ref 24–34)
MCHC RBC AUTO-ENTMCNC: 34.1 G/DL (ref 31–37)
MCV RBC AUTO: 94 FL (ref 74–97)
P-R INTERVAL, ECG05: 156 MS
PLATELET # BLD AUTO: 218 K/UL (ref 135–420)
PMV BLD AUTO: 9.7 FL (ref 9.2–11.8)
POTASSIUM SERPL-SCNC: 3.9 MMOL/L (ref 3.5–5.5)
Q-T INTERVAL, ECG07: 398 MS
QRS DURATION, ECG06: 78 MS
QTC CALCULATION (BEZET), ECG08: 410 MS
RBC # BLD AUTO: 4.49 M/UL (ref 4.35–5.65)
SODIUM SERPL-SCNC: 138 MMOL/L (ref 136–145)
VENTRICULAR RATE, ECG03: 64 BPM
WBC # BLD AUTO: 8.6 K/UL (ref 4.6–13.2)

## 2021-07-07 PROCEDURE — 65660000004 HC RM CVT STEPDOWN

## 2021-07-07 PROCEDURE — 93306 TTE W/DOPPLER COMPLETE: CPT | Performed by: INTERNAL MEDICINE

## 2021-07-07 PROCEDURE — 74011250637 HC RX REV CODE- 250/637: Performed by: INTERNAL MEDICINE

## 2021-07-07 PROCEDURE — 99233 SBSQ HOSP IP/OBS HIGH 50: CPT | Performed by: INTERNAL MEDICINE

## 2021-07-07 PROCEDURE — 36415 COLL VENOUS BLD VENIPUNCTURE: CPT

## 2021-07-07 PROCEDURE — 74011636637 HC RX REV CODE- 636/637: Performed by: INTERNAL MEDICINE

## 2021-07-07 PROCEDURE — 2709999900 HC NON-CHARGEABLE SUPPLY

## 2021-07-07 PROCEDURE — 93005 ELECTROCARDIOGRAM TRACING: CPT

## 2021-07-07 PROCEDURE — C8929 TTE W OR WO FOL WCON,DOPPLER: HCPCS

## 2021-07-07 PROCEDURE — 85027 COMPLETE CBC AUTOMATED: CPT

## 2021-07-07 PROCEDURE — 85018 HEMOGLOBIN: CPT

## 2021-07-07 PROCEDURE — 97165 OT EVAL LOW COMPLEX 30 MIN: CPT

## 2021-07-07 PROCEDURE — 82962 GLUCOSE BLOOD TEST: CPT

## 2021-07-07 PROCEDURE — 74011250636 HC RX REV CODE- 250/636: Performed by: INTERNAL MEDICINE

## 2021-07-07 PROCEDURE — 83735 ASSAY OF MAGNESIUM: CPT

## 2021-07-07 PROCEDURE — 99232 SBSQ HOSP IP/OBS MODERATE 35: CPT | Performed by: HOSPITALIST

## 2021-07-07 PROCEDURE — 74011250636 HC RX REV CODE- 250/636: Performed by: HOSPITALIST

## 2021-07-07 PROCEDURE — 80048 BASIC METABOLIC PNL TOTAL CA: CPT

## 2021-07-07 PROCEDURE — 97161 PT EVAL LOW COMPLEX 20 MIN: CPT

## 2021-07-07 RX ORDER — ROSUVASTATIN CALCIUM 20 MG/1
20 TABLET, COATED ORAL
Status: DISCONTINUED | OUTPATIENT
Start: 2021-07-07 | End: 2021-07-08 | Stop reason: HOSPADM

## 2021-07-07 RX ORDER — AMLODIPINE BESYLATE 5 MG/1
5 TABLET ORAL DAILY
Status: DISCONTINUED | OUTPATIENT
Start: 2021-07-08 | End: 2021-07-08

## 2021-07-07 RX ORDER — METOPROLOL SUCCINATE 25 MG/1
25 TABLET, EXTENDED RELEASE ORAL DAILY
Status: DISCONTINUED | OUTPATIENT
Start: 2021-07-07 | End: 2021-07-08 | Stop reason: HOSPADM

## 2021-07-07 RX ADMIN — Medication 10 ML: at 15:32

## 2021-07-07 RX ADMIN — ENOXAPARIN SODIUM 40 MG: 40 INJECTION SUBCUTANEOUS at 09:05

## 2021-07-07 RX ADMIN — TICAGRELOR 90 MG: 90 TABLET ORAL at 09:04

## 2021-07-07 RX ADMIN — Medication 10 ML: at 09:08

## 2021-07-07 RX ADMIN — ROSUVASTATIN CALCIUM 20 MG: 20 TABLET, COATED ORAL at 21:51

## 2021-07-07 RX ADMIN — ACETAMINOPHEN 650 MG: 325 TABLET ORAL at 09:04

## 2021-07-07 RX ADMIN — TICAGRELOR 90 MG: 90 TABLET ORAL at 17:03

## 2021-07-07 RX ADMIN — INSULIN LISPRO 2 UNITS: 100 INJECTION, SOLUTION INTRAVENOUS; SUBCUTANEOUS at 12:17

## 2021-07-07 RX ADMIN — METOPROLOL SUCCINATE 25 MG: 25 TABLET, EXTENDED RELEASE ORAL at 12:17

## 2021-07-07 RX ADMIN — PERFLUTREN 2 ML: 6.52 INJECTION, SUSPENSION INTRAVENOUS at 11:31

## 2021-07-07 RX ADMIN — ACETAMINOPHEN 650 MG: 325 TABLET ORAL at 00:32

## 2021-07-07 RX ADMIN — Medication 81 MG: at 09:04

## 2021-07-07 NOTE — ROUTINE PROCESS
0730 Received report from BenLetts. Patient alert and oriented. EKG was done by out going shift. 0930 Patient sitting in the recliner. Denies any pain. 1130 Patient off the floor to Echocardiogram.      1230 Patient back in the bed. Right groin dressing intact. 1630 Patient resting in the bed. Wife at bedside. Denies any pain. Call bell and telephone placed within reach. 1906 Bedside and Verbal shift change report given to Yuli (oncoming nurse) by Anthony Dill RN (offgoing nurse). Report included the following information SBAR, Kardex, Intake/Output, MAR, Recent Results and Cardiac Rhythm NSR.

## 2021-07-07 NOTE — PROGRESS NOTES
Problem: Diabetes Self-Management  Goal: *Disease process and treatment process  Description: Define diabetes and identify own type of diabetes; list 3 options for treating diabetes. Outcome: Progressing Towards Goal  Goal: *Incorporating nutritional management into lifestyle  Description: Describe effect of type, amount and timing of food on blood glucose; list 3 methods for planning meals. Outcome: Progressing Towards Goal  Goal: *Incorporating physical activity into lifestyle  Description: State effect of exercise on blood glucose levels. Outcome: Progressing Towards Goal  Goal: *Developing strategies to promote health/change behavior  Description: Define the ABC's of diabetes; identify appropriate screenings, schedule and personal plan for screenings. Outcome: Progressing Towards Goal  Goal: *Using medications safely  Description: State effect of diabetes medications on diabetes; name diabetes medication taking, action and side effects. Outcome: Progressing Towards Goal     Problem: Falls - Risk of  Goal: *Absence of Falls  Description: Document Karin Coronado Fall Risk and appropriate interventions in the flowsheet.   Outcome: Progressing Towards Goal  Note: Fall Risk Interventions:            Medication Interventions: Teach patient to arise slowly, Patient to call before getting OOB

## 2021-07-07 NOTE — PROGRESS NOTES
Cardiology Progress Note      Attending Cardiologist: Dr. Venecia Sinha:     Hospital Problems  Never Reviewed        Codes Class Noted POA    * (Principal) ACS (acute coronary syndrome) ([facility] [place] [address] ICD-10-CM: I24.9  ICD-9-CM: 411.1  7/6/2021 Unknown            -Presented with Unstable angina, stuttering CP for 2-3 weeks, with associated diaphoresis and dizziness. ECG with inferior ST wave abnormalities. Troponin pending. CP improved with SLN and ASA, but not completely resolved. Patient was taken to the cath lab for intervention. -CAD, s/p [facility] with PCI to RCA (07/06/21)   -Hypertension.  -Diabetes mellitus.  -Dyslipidemia. -H/o remote tobacco use. -H/o family history of CAD, unclear specifics. No primary cardiologist. Will establish care with Dr. Carlo Maxwell. Plan:     -Patient doing well s/p PCI. Right groin with hematoma. Blood pressures have been stable. Will continue to monitor overnight, with close monitoring of his H/H. Hgb and Scr currently stable. -Continued on ASA, Brilinta and statin.   -Continue Metoprolol.   -Plan to follow up with Dr. Carlo Maxwell in the office in 3-4 weeks. Subjective:     Reports pain in the right groin that radiates to his right flank. No bruising noted on flank. This started last night. He had some pain relief after given tylenol last night. Denies CP. States he feels better than when he came in. Past Medical History:   No past medical history on file.       Social History:     Social History     Socioeconomic History    Marital status:      Spouse name: Not on file    Number of children: Not on file    Years of education: Not on file    Highest education level: Not on file     Social Determinants of Health     Financial Resource Strain:     Difficulty of Paying Living Expenses:    Food Insecurity:     Worried About Running Out of Food in the Last Year:     [address] in the Last Year:    Transportation Needs:     Lack of Transportation (Medical):  Lack of Transportation (Non-Medical):    Physical Activity:     Days of Exercise per Week:     Minutes of Exercise per Session:    Stress:     Feeling of Stress :    Social Connections:     Frequency of Communication with Friends and Family:     Frequency of Social Gatherings with Friends and Family:     Attends [employer] Services:     Active Member of Clubs or Organizations:     Attends Club or Organization Meetings:     Marital Status:         Family History:   No family history on file.      Medications:   No Known Allergies     Current Facility-Administered Medications   Medication Dose Route Frequency    nitroglycerin (TRIDIL) 400 mcg/ml infusion  0-20 mcg/min IntraVENous TITRATE    sodium chloride (NS) flush 5-40 mL  5-40 mL IntraVENous Q8H    sodium chloride (NS) flush 5-40 mL  5-40 mL IntraVENous PRN    aspirin delayed-release tablet 81 mg  81 mg Oral DAILY    ticagrelor (BRILINTA) tablet 90 mg  90 mg Oral BID    sodium chloride (NS) flush 5-40 mL  5-40 mL IntraVENous Q8H    sodium chloride (NS) flush 5-40 mL  5-40 mL IntraVENous PRN    acetaminophen (TYLENOL) tablet 650 mg  650 mg Oral Q6H PRN    Or    acetaminophen (TYLENOL) suppository 650 mg  650 mg Rectal Q6H PRN    polyethylene glycol (MIRALAX) packet 17 g  17 g Oral DAILY PRN    promethazine (PHENERGAN) tablet 12.5 mg  12.5 mg Oral Q6H PRN    Or    ondansetron (ZOFRAN) injection 4 mg  4 mg IntraVENous Q6H PRN    enoxaparin (LOVENOX) injection 40 mg  40 mg SubCUTAneous DAILY    insulin lispro (HUMALOG) injection   SubCUTAneous AC&HS    glucose chewable tablet 16 g  4 Tablet Oral PRN    glucagon (GLUCAGEN) injection 1 mg  1 mg IntraMUSCular PRN    dextrose (D50W) injection syrg 12.5-25 g  25-50 mL IntraVENous PRN    morphine injection 1 mg  1 mg IntraVENous Q1H PRN         Physical Exam:     Visit Vitals  /77   Pulse 71   Temp 97.8 °F (36.6 °C)   Resp 18   Ht 5' 11\" (1.803 m)   Wt 102.1 kg (225 lb)   SpO2 98%   BMI 31.38 kg/m²       TELE: SR    BP Readings from Last 3 Encounters:   07/07/21 137/77   11/23/18 113/67     Pulse Readings from Last 3 Encounters:   07/07/21 71   11/23/18 82     Wt Readings from Last 3 Encounters:   07/06/21 102.1 kg (225 lb)   11/23/18 95.3 kg (210 lb)       General:  No distress, appears stated age  Neck:  no JVD  Lungs:  clear to auscultation bilaterally  Heart:  regular rate and rhythm  Abdomen:  abdomen is soft without significant tenderness, masses, organomegaly or guarding  Extremities:  extremities normal, atraumatic, no cyanosis or edema; right groin with hematoma. No Bruit appreciated, distal pulses intact.         Data Review:     Recent Labs     07/07/21  0522 07/06/21  0925   WBC 8.6 7.0   HGB 14.4 14.8   HCT 42.2 42.8    242     Recent Labs     07/07/21  0522 07/06/21  0925    134*   K 3.9 3.9    102   CO2 25 20*   * 242*   BUN 20* 23*   CREA 0.97 1.44*   CA 8.7 9.2   MG 2.1 2.1   ALB  --  4.1   ALT  --  108*   INR  --  1.0       Results for orders placed or performed during the hospital encounter of 07/06/21   EKG, 12 LEAD, INITIAL   Result Value Ref Range    Ventricular Rate 75 BPM    Atrial Rate 75 BPM    P-R Interval 146 ms    QRS Duration 90 ms    Q-T Interval 396 ms    QTC Calculation (Bezet) 442 ms    Calculated P Axis 6 degrees    Calculated R Axis 8 degrees    Calculated T Axis -49 degrees    Diagnosis       Normal sinus rhythm  Inferior infarct , age undetermined  Abnormal ECG  No previous ECGs available  Confirmed by Luke Olivier MD, ----- ([id number]) on 7/6/2021 6:52:12 PM         All Cardiac Markers in the last 24 hours:    Lab Results   Component Value Date/Time     07/06/2021 09:25 AM    CKMB 2.7 07/06/2021 09:25 AM    CKND1 1.3 07/06/2021 09:25 AM    TROIQ 0.10 (H) 07/06/2021 09:25 AM       Last Lipid:  No results found for: CHOL, CHOLX, CHLST, CHOLV, HDL, HDLP, LDL, LDLC, DLDLP, TGLX, TRIGL, TRIGP, CHHD, [address] [place] [address]:     EKG Results     Procedure 720 Value Units Date/Time    EKG, 12 LEAD, SUBSEQUENT [[id number]]     Order Status: Sent     EKG, 12 LEAD, INITIAL [[id number]] Collected: 07/06/21 0919    Order Status: Completed Updated: 07/06/21 1852     Ventricular Rate 75 BPM      Atrial Rate 75 BPM      P-R Interval 146 ms      QRS Duration 90 ms      Q-T Interval 396 ms      QTC Calculation (Bezet) 442 ms      Calculated P Axis 6 degrees      Calculated R Axis 8 degrees      Calculated T Axis -49 degrees      Diagnosis --     Normal sinus rhythm  Inferior infarct , age undetermined  Abnormal ECG  No previous ECGs available  Confirmed by Duke Trevino MD, ----- ([id number]) on 7/6/2021 6:52:12 PM      EKG, 12 LEAD, SUBSEQUENT [[id number]]     Order Status: Canceled                   XR Results (most recent):  Results from Hospital Encounter encounter on 11/23/18    XR CHEST SNGL V    Narrative  EXAM:  Chest AP    INDICATIONS: Right thoracentesis    COMPARISON: 11/23/2018 at 11:57 AM    FINDINGS:    No pneumothorax. Small moderate right layering pleural effusion, unchanged. Normal cardiomediastinal contours. Impression  IMPRESSION:  No pneumothorax post right thoracentesis. Unchanged effusion.         Signed By: Nirmala Betancourt PA-C     July 7, 2021

## 2021-07-07 NOTE — PROGRESS NOTES
Reason for Admission:  ACS (acute coronary syndrome) ([facility] [address] [I24.9]                 RUR Score:    11%            Plan for utilizing home health:    Not at this time. Likelihood of Readmission:   LOW                         Transition of Care Plan:              Initial assessment completed. Cognitive status of patient: oriented to time, place, person and situation. Face sheet information confirmed:  yes. The patient designates his wife Riaz Wan to participate in his discharge plan and to receive any needed information. This patient lives in a single family home. Patient is able to navigate steps as needed. Prior to hospitalization, patient was considered to be independent with ADLs/IADLS : yes . Patient has a current ACP document on file. No      Healthcare Decision Maker:     Click here to complete [address] including selection of the Healthcare Decision Maker Relationship (ie \"Primary\")    The spouse will be available to transport patient home upon discharge. The patient already has no medical equipment available in the home. Patient is not currently active with home health  Patient has not stayed in a skilled nursing facility or rehab. This patient is on dialysis :no    Currently, the discharge plan is Home. The patient states that he can obtain his medications from the pharmacy, and take his medications as directed. Patient's current insurance is [employer]       Care Management Interventions  PCP Verified by CM: Yes  Mode of Transport at Discharge:  Other (see comment) (spouse)  Transition of Care Consult (CM Consult): Discharge Planning  Current Support Network: Lives with Spouse  Discharge Location  Discharge Placement: [address] RN BSN  Care Manager  [phone]

## 2021-07-07 NOTE — PROGRESS NOTES
OCCUPATIONAL THERAPY EVALUATION/DISCHARGE    Patient: Kavon Doll (82 y.o. male)  Date: 7/7/2021  Primary Diagnosis: ACS (acute coronary syndrome) (HCC) [I24.9]  Procedure(s) (LRB):  LEFT HEART CATH (N/A)  CORONARY ANGIOGRAPHY (Right)  Left Ventriculography (N/A)  Insert Stent Bipin Coronary (N/A) 1 Day Post-Op   Precautions:    (standard)  PLOF: Pt reports being independent in ADLs and functional mobility. Pt also works full time as . ASSESSMENT AND RECOMMENDATIONS:  Based on the objective data described below, the patient presents with ability to perform basic ADLs and functional transfers at baseline level of function. Pt performed/simulated UB/LB ADLs with Mod Ind for seated and std aspects. Pt demonstrates good safety awareness during all standing tasks. Pt lives with supportive family available to assist as needed. Skilled occupational therapy is not indicated at this time. Discharge Recommendations: None  Further Equipment Recommendations for Discharge: N/A      SUBJECTIVE:   Patient stated I feel fine, just old age.     OBJECTIVE DATA SUMMARY:   No past medical history on file. No past surgical history on file. Barriers to Learning/Limitations: None  Compensate with: visual, verbal, tactile, kinesthetic cues/model    Home Situation:   Home Situation  Home Environment: Private residence  One/Two Story Residence: Two story  Living Alone: No  Support Systems: Family member(s)  Patient Expects to be Discharged to[de-identified] House  Current DME Used/Available at Home: Glucometer, Blood pressure cuff  Tub or Shower Type: Tub/Shower combination  [x]     Right hand dominant   []     Left hand dominant    Cognitive/Behavioral Status:  Neurologic State: Alert  Orientation Level: Oriented X4  Cognition: Follows commands  Safety/Judgement: Awareness of environment; Fall prevention    Skin: visible skin intact  Edema:none noted    Vision/Perceptual:       Acuity: Able to read clock/calendar on wall without difficulty     Coordination: BUE  Coordination: Generally decreased, functional  Fine Motor Skills-Upper: Left Intact; Right Intact    Gross Motor Skills-Upper: Left Intact; Right Intact    Balance:  Sitting: Intact  Standing: Intact    Strength: BUE  Strength: Generally decreased, functional   Tone & Sensation: BUE  Tone: Normal  Sensation: Intact   Range of Motion: BUE  AROM: Generally decreased, functional   Functional Mobility and Transfers for ADLs:  Transfers:  Sit to Stand: Modified independent   Toilet Transfer : Modified independent    ADL Assessment:  Feeding: Independent  Oral Facial Hygiene/Grooming: Independent  Bathing: Modified independent  Upper Body Dressing: Independent  Lower Body Dressing: Modified independent  Toileting: Modified independent   ADL Intervention:   Cognitive Retraining  Safety/Judgement: Awareness of environment; Fall prevention  Pain:  Pain level pre-treatment: 0/10   Pain level post-treatment: 0/10     Activity Tolerance:   Good  Please refer to the flowsheet for vital signs taken during this treatment. After treatment:   [x]  Patient left in no apparent distress sitting up in chair  []  Patient left in no apparent distress in bed  [x]  Call bell left within reach  [x]  Nursing notified  []  Caregiver present  []  Bed alarm activated    COMMUNICATION/EDUCATION:   [x]      Role of Occupational Therapy in the acute care setting  [x]      Home safety education was provided and the patient/caregiver indicated understanding. [x]      Patient/family have participated as able and agree with findings and recommendations. []      Patient is unable to participate in plan of care at this time. Thank you for this referral.  Isabelle Alatorre OTR/L  Time Calculation: 8 mins      Eval Complexity: History: LOW Complexity : Brief history review ;    Examination: LOW Complexity : 1-3 performance deficits relating to physical, cognitive , or psychosocial skils that result in activity limitations and / or participation restrictions ;    Decision Making:LOW Complexity : No comorbidities that affect functional and no verbal or physical assistance needed to complete eval tasks

## 2021-07-07 NOTE — PROGRESS NOTES
Problem: Mobility Impaired (Adult and Pediatric)  Goal: *Acute Goals and Plan of Care (Insert Text)  Description: Pt able to amb and perform all mobility indep without need for an AD. He declined the need to practice stairs prior to d/c. Pt not in need of acute PT at this time, recommend d/c home. PLOF: Pt lives with his wife in a 2-story home with 10 steps. Indep PTA. Outcome: Resolved/Met     PHYSICAL THERAPY EVALUATION AND DISCHARGE    Patient: Clearance Breanna (78 y.o. male)  Date: 7/7/2021  Primary Diagnosis: ACS (acute coronary syndrome) (HCC) [I24.9]  Procedure(s) (LRB):  LEFT HEART CATH (N/A)  CORONARY ANGIOGRAPHY (Right)  Left Ventriculography (N/A)  Insert Stent Bipin Coronary (N/A) 1 Day Post-Op   Precautions:   (standard)    ASSESSMENT :  Based on the objective data described below, the patient presents with decreased endurance. RN cleared for mobility. Pt found sitting up in chair, in NAD, wife at bedside. He reports feeling fine. Grossly 4/5 strength B Le's. Pt stood and amb around room with supervision and no AD, no obvious functional mobility deficits. He declined the need to practice stairs. Pt left sitting up in chair with call bell and all needs met. Pt no in need of acute PT. Recommend d/c home. Patient does not require further skilled intervention at this level of care. PLAN :  Recommendations and Planned Interventions:   No formal PT needs identified at this time. Discharge Recommendations: home  Further Equipment Recommendations for Discharge: N/A     SUBJECTIVE:   Patient stated I can dance too.     OBJECTIVE DATA SUMMARY:   No past medical history on file. No past surgical history on file.   Barriers to Learning/Limitations: None  Compensate with: N/A  Home Situation:   Home Situation  Home Environment: Private residence  One/Two Story Residence: Two story  Living Alone: No  Support Systems: Family member(s)  Patient Expects to be Discharged to[de-identified] House  Current DME Used/Available at Home: Glucometer, Blood pressure cuff  Tub or Shower Type: Tub/Shower combination  Critical Behavior:  Neurologic State: Alert  Orientation Level: Oriented X4  Cognition: Follows commands  Safety/Judgement: Awareness of environment; Fall prevention  Psychosocial  Patient Behaviors: Calm; Cooperative  Purposeful Interaction: Yes  Pt Identified Daily Priority: Clinical issues (comment)  Caritas Process: Establish trust;Teaching/learning; Attend basic human needs;Create healing environment;Supportive expression  Caring Interventions: Reassure; Therapeutic modalities  Reassure: Therapeutic listening;Caring rounds; Informing  Therapeutic Modalities: Deep breathing;Humor; Intentional therapeutic touch       Strength:    Strength: Generally decreased, functional    Tone & Sensation:   Tone: Normal    Sensation: Intact    Range Of Motion:  AROM: Generally decreased, functional          Transfers:  Sit to Stand: Modified independent  Stand to Sit: Modified independent    Balance:   Sitting: Intact  Standing: Intact    Ambulation/Gait Training:  Distance (ft): 30 Feet (ft)  Assistive Device: Other (comment) (none)  Ambulation - Level of Assistance: Supervision        Gait Abnormalities: Decreased step clearance              Speed/Tess: Slow  Step Length: Right shortened;Left shortened    Stairs:     Stairs - Level of Assistance:  (declined need to practice)    Pain:  Pain level pre-treatment: 0/10   Pain level post-treatment: 0/10  Pain Intervention(s): Medication (see MAR); Rest, Ice, Repositioning   Response to intervention: Nurse notified, See doc flow    Activity Tolerance:   Pt tolerated mobility well  Please refer to the flowsheet for vital signs taken during this treatment.   After treatment:   [x]         Patient left in no apparent distress sitting up in chair  []         Patient left in no apparent distress in bed  [x]         Call bell left within reach  [x]         Nursing notified  []         Caregiver present  [] Bed alarm activated  []         SCDs applied    COMMUNICATION/EDUCATION:   [x]         Role of Physical Therapy in the acute care setting. [x]         Fall prevention education was provided and the patient/caregiver indicated understanding. [x]         Patient/family have participated as able in goal setting and plan of care. []         Patient/family agree to work toward stated goals and plan of care. []         Patient understands intent and goals of therapy, but is neutral about his/her participation. []         Patient is unable to participate in goal setting/plan of care: ongoing with therapy staff.  []         Other:     Thank you for this referral.  Bozena Chambers   Time Calculation: 10 mins      Eval Complexity: History: LOW Complexity : Zero comorbidities / personal factors that will impact the outcome / POCExam:LOW Complexity : 1-2 Standardized tests and measures addressing body structure, function, activity limitation and / or participation in recreation  Presentation: LOW Complexity : Stable, uncomplicated  Clinical Decision Making:Low Complexity    Overall Complexity:LOW

## 2021-07-07 NOTE — DIABETES MGMT
Diabetes Plan of Care    T2DM with A1c of 6.9% (7/06/2021)  Home diabetes medications: Patient reported on 7/07:  Januvia 25 mg daily  Jardiance daily  Metformin 1000 mg 2 times daily with meals    Patient was admitted on 7/06/2021 with report of chest pain. Noted the following assessment:  Unstable angina  Status post left heart catheterization 7/06/21 with PCI to RCA  Right groin with hematoma    7/07: Seen patient this afternoon in CVT SD unit  Glycemic assessment:  POC BG 7/6: 198  POC BG 7/07 at time of review: 150, 179, 129    Recommendation(s):  1.) correctional lispro insulin as ordered. 2.) resume home diabetes meds at disch. Most recent blood glucose values: Within target range : Yes. Current A1c 6.9% (7/06/2021) which is equivalent to estimated average blood glucose of 151 mg/dL during the past 2-3 months    Adequate glycemic control PTA: Yes. Current hospital diabetes medications:  Correctional lispro insulin ACHS. Normal sensitivity dose.     TDD previous day 7/06:  Lispro: 2 units    Diet: Regular; 4 carb choices (60 gm/meal)    Goals: Blood glucose will be within target of 70 - 180 mg/dl by: 7/10/2021    Education:  _____ Refer to Diabetes Education Record                       __X___ Education not indicated at this time       Brent Madison RN [facility]  Pager: [phone]

## 2021-07-07 NOTE — PROGRESS NOTES
[facility]  [facility]    Patient: Abbe Harry Age: 62 y.o. : 1960 MR#: [id number] SSN: [id number]  Date/Time: 2021     Subjective:     Patient seen and evaluated, lying in bed, no acute distress. Patient underwent cardiac catheterization yesterday with RCA stent placement. Assessment/Plan:     1. Acute coronary syndrome, NSTEMI-patient underwent cardiac catheterization with RCA stent placement. Cardiology on board, further management per cardiology, continue Brilinta and aspirin. 2. Right groin hematoma post procedure-monitor H&H, anticipate discharge in a.m. if H&H remained stable and patient is doing well. 3. History of hypertension-continue beta-blocker and Norvasc, monitor blood pressure  4. History of type 2 diabetes-A1c 6.9, insulin sliding scale, monitor blood sugars  DVT prophylaxis-Brilinta  Full code    Anticipate discharge home in a.m. Ken Thornton MD  21      Case discussed with:  [x]Patient  []Family  []Nursing  []Case Management  DVT Prophylaxis:  []Lovenox  []Hep SQ  []SCDs  []Coumadin   []On Heparin gtt    Objective:   VS:   Visit Vitals  BP (!) 142/82   Pulse 67   Temp 97.5 °F (36.4 °C)   Resp 18   Ht 5' 11\" (1.803 m)   Wt 95.3 kg (210 lb)   SpO2 99%   BMI 29.29 kg/m²      Tmax/24hrs: Temp (24hrs), Av.6 °F (36.4 °C), Min:97.2 °F (36.2 °C), Max:97.9 °F (36.6 °C)  IOBRIEF    Intake/Output Summary (Last 24 hours) at 2021 1704  Last data filed at 2021 1318  Gross per 24 hour   Intake 938.75 ml   Output 850 ml   Net 88.75 ml       General:  Alert, cooperative, no acute distress    HEENT: PERRLA, anicteric sclerae. Pulmonary:  CTA Bilaterally. No Wheezing/Rhonchi/Rales. Cardiovascular: Regular rate and Rhythm. GI:  Soft, Non distended, Non tender. + Bowel sounds. Extremities:  No edema, cyanosis, clubbing. No calf tenderness. Neurologic: Alert and oriented X 3.  No acute neuro deficits. Additional:    Medications:   Current Facility-Administered Medications   Medication Dose Route Frequency    rosuvastatin (CRESTOR) tablet 20 mg  20 mg Oral QHS    metoprolol succinate (TOPROL-XL) XL tablet 25 mg  25 mg Oral DAILY    [START ON 7/8/2021] amLODIPine (NORVASC) tablet 5 mg  5 mg Oral DAILY    sodium chloride (NS) flush 5-40 mL  5-40 mL IntraVENous Q8H    sodium chloride (NS) flush 5-40 mL  5-40 mL IntraVENous PRN    aspirin delayed-release tablet 81 mg  81 mg Oral DAILY    ticagrelor (BRILINTA) tablet 90 mg  90 mg Oral BID    sodium chloride (NS) flush 5-40 mL  5-40 mL IntraVENous PRN    acetaminophen (TYLENOL) tablet 650 mg  650 mg Oral Q6H PRN    Or    acetaminophen (TYLENOL) suppository 650 mg  650 mg Rectal Q6H PRN    polyethylene glycol (MIRALAX) packet 17 g  17 g Oral DAILY PRN    promethazine (PHENERGAN) tablet 12.5 mg  12.5 mg Oral Q6H PRN    Or    ondansetron (ZOFRAN) injection 4 mg  4 mg IntraVENous Q6H PRN    insulin lispro (HUMALOG) injection   SubCUTAneous AC&HS    glucose chewable tablet 16 g  4 Tablet Oral PRN    glucagon (GLUCAGEN) injection 1 mg  1 mg IntraMUSCular PRN    dextrose (D50W) injection syrg 12.5-25 g  25-50 mL IntraVENous PRN    morphine injection 1 mg  1 mg IntraVENous Q1H PRN       Imaging:   XR Results (most recent):  Results from Hospital Encounter encounter on 11/23/18    XR CHEST SNGL V    Narrative  EXAM:  Chest AP    INDICATIONS: Right thoracentesis    COMPARISON: 11/23/2018 at 11:57 AM    FINDINGS:    No pneumothorax. Small moderate right layering pleural effusion, unchanged. Normal cardiomediastinal contours. Impression  IMPRESSION:  No pneumothorax post right thoracentesis. Unchanged effusion. CT Results (most recent):  Results from Hospital Encounter encounter on 12/10/18    CT CHEST WO CONT    Narrative  CT chest without contrast.    INDICATION: Pleural effusion.     TECHNIQUE: Exam without contrast.    FINDINGS: Coronary artery calcifications. Pretracheal lymph node identified  image 19 series 2 short axis measuring 10 mm. Subcarinal enlarged lymph node  short axis measuring 1.56 cm. Masslike pleural-based density identified right  lower lobe measuring approximately 8.5 x 5.4 cm image 37 series 2. Hounsfield  units are in the range of fluid. There is associated infiltrative and/or  atelectatic change in the adjacent lung. This fluid collection is loculated. Scarlike area identified right upper lobe image 23 series 3. With other adjacent  scarlike or nodular areas as well. Scattered arthritic change within the  thoracic spine. Upper abdominal structures unremarkable. Impression  IMPRESSION:  1. Masslike density in the right lower lobe has CT readings in the range of  fluid and appears to represent a loculated fluid collection with associated  atelectatic and or infiltrative changes within the adjacent lung. 2. There is a scarlike area identified in the right upper lobe maximum dimension  1.3 x 0.9 cm. With other associated nodular and scarlike areas adjacent to it. Follow-up CT scan recommended in approximately 4-6 months. 3. Scattered arthritis within the thoracic spine. 4. Slightly prominent mediastinal lymph nodes as described above. 07/06/21    ECHO ADULT COMPLETE 07/07/2021 7/7/2021    Interpretation Summary  · Contrast used: DEFINITY. · LV: Estimated LVEF is 55 - 60%. Visually measured ejection fraction. Normal cavity size and systolic function (ejection fraction normal). Mild concentric hypertrophy. Wall motion: normal. Age-appropriate left ventricular diastolic function. · AO: Mild aortic root dilatation. (3.7 cm)    Signed by: Mar Salazar MD on 7/7/2021 12:04 PM       Labs:    Recent Results (from the past 48 hour(s))   EKG, 12 LEAD, INITIAL    Collection Time: 07/06/21  9:19 AM   Result Value Ref Range    Ventricular Rate 75 BPM    Atrial Rate 75 BPM    P-R Interval 146 ms    QRS Duration 90 ms    Q-T Interval 396 ms    QTC Calculation (Bezet) 442 ms    Calculated P Axis 6 degrees    Calculated R Axis 8 degrees    Calculated T Axis -49 degrees    Diagnosis       Normal sinus rhythm  Inferior infarct , age undetermined  Abnormal ECG  No previous ECGs available  Confirmed by Can Ornelas MD, ----- ([id number]) on 7/6/2021 6:52:12 PM     CBC WITH AUTOMATED DIFF    Collection Time: 07/06/21  9:25 AM   Result Value Ref Range    WBC 7.0 4.6 - 13.2 K/uL    RBC 4.66 4.35 - 5.65 M/uL    HGB 14.8 13.0 - 16.0 g/dL    HCT 42.8 36.0 - 48.0 %    MCV 91.8 74.0 - 97.0 FL    MCH 31.8 24.0 - 34.0 PG    MCHC 34.6 31.0 - 37.0 g/dL    RDW 12.3 11.6 - 14.5 %    PLATELET 930 900 - 387 K/uL    MPV 9.7 9.2 - 11.8 FL    NEUTROPHILS 65 40 - 73 %    LYMPHOCYTES 23 21 - 52 %    MONOCYTES 9 3 - 10 %    EOSINOPHILS 2 0 - 5 %    BASOPHILS 1 0 - 2 %    ABS. NEUTROPHILS 4.5 1.8 - 8.0 K/UL    ABS. LYMPHOCYTES 1.6 0.9 - 3.6 K/UL    ABS. MONOCYTES 0.6 0.05 - 1.2 K/UL    ABS. EOSINOPHILS 0.2 0.0 - 0.4 K/UL    ABS. BASOPHILS 0.0 0.0 - 0.1 K/UL    DF AUTOMATED     METABOLIC PANEL, COMPREHENSIVE    Collection Time: 07/06/21  9:25 AM   Result Value Ref Range    Sodium 134 (L) 136 - 145 mmol/L    Potassium 3.9 3.5 - 5.5 mmol/L    Chloride 102 100 - 111 mmol/L    CO2 20 (L) 21 - 32 mmol/L    Anion gap 12 3.0 - 18 mmol/L    Glucose 242 (H) 74 - 99 mg/dL    BUN 23 (H) 7.0 - 18 MG/DL    Creatinine 1.44 (H) 0.6 - 1.3 MG/DL    BUN/Creatinine ratio 16 12 - 20      GFR est AA >60 >60 ml/min/1.73m2    GFR est non-AA 50 (L) >60 ml/min/1.73m2    Calcium 9.2 8.5 - 10.1 MG/DL    Bilirubin, total 0.4 0.2 - 1.0 MG/DL    ALT (SGPT) 108 (H) 16 - 61 U/L    AST (SGOT) 47 (H) 10 - 38 U/L    Alk.  phosphatase 102 45 - 117 U/L    Protein, total 7.8 6.4 - 8.2 g/dL    Albumin 4.1 3.4 - 5.0 g/dL    Globulin 3.7 2.0 - 4.0 g/dL    A-G Ratio 1.1 0.8 - 1.7     MAGNESIUM    Collection Time: 07/06/21  9:25 AM   Result Value Ref Range    Magnesium 2.1 1.6 - 2.6 mg/dL   PTT    Collection Time: 07/06/21  9:25 AM   Result Value Ref Range    aPTT 27.0 23.0 - 36.4 SEC   PROTHROMBIN TIME + INR    Collection Time: 07/06/21  9:25 AM   Result Value Ref Range    Prothrombin time 12.7 11.5 - 15.2 sec    INR 1.0 0.8 - 1.2     NT-PRO BNP    Collection Time: 07/06/21  9:25 AM   Result Value Ref Range    NT pro-BNP 47 0 - 900 PG/ML   CARDIAC PANEL,(CK, CKMB & TROPONIN)    Collection Time: 07/06/21  9:25 AM   Result Value Ref Range    CK - MB 2.7 <3.6 ng/ml    CK-MB Index 1.3 0.0 - 4.0 %     39 - 308 U/L    Troponin-I, QT 0.10 (H) 0.0 - 0.045 NG/ML   HEMOGLOBIN A1C WITH EAG    Collection Time: 07/06/21  9:25 AM   Result Value Ref Range    Hemoglobin A1c 6.9 (H) 4.2 - 5.6 %    Est. average glucose 151 mg/dL   GLUCOSE, POC    Collection Time: 07/06/21  2:45 PM   Result Value Ref Range    Glucose (POC) 134 (H) 70 - 110 mg/dL   GLUCOSE, POC    Collection Time: 07/06/21  3:41 PM   Result Value Ref Range    Glucose (POC) 177 (H) 70 - 110 mg/dL   GLUCOSE, POC    Collection Time: 07/06/21  4:59 PM   Result Value Ref Range    Glucose (POC) 198 (H) 70 - 110 mg/dL   GLUCOSE, POC    Collection Time: 07/06/21  9:52 PM   Result Value Ref Range    Glucose (POC) 136 (H) 70 - 886 mg/dL   METABOLIC PANEL, BASIC    Collection Time: 07/07/21  5:22 AM   Result Value Ref Range    Sodium 138 136 - 145 mmol/L    Potassium 3.9 3.5 - 5.5 mmol/L    Chloride 107 100 - 111 mmol/L    CO2 25 21 - 32 mmol/L    Anion gap 6 3.0 - 18 mmol/L    Glucose 138 (H) 74 - 99 mg/dL    BUN 20 (H) 7.0 - 18 MG/DL    Creatinine 0.97 0.6 - 1.3 MG/DL    BUN/Creatinine ratio 21 (H) 12 - 20      GFR est AA >60 >60 ml/min/1.73m2    GFR est non-AA >60 >60 ml/min/1.73m2    Calcium 8.7 8.5 - 10.1 MG/DL   MAGNESIUM    Collection Time: 07/07/21  5:22 AM   Result Value Ref Range    Magnesium 2.1 1.6 - 2.6 mg/dL   CBC W/O DIFF    Collection Time: 07/07/21  5:22 AM   Result Value Ref Range    WBC 8.6 4.6 - 13.2 K/uL    RBC 4.49 4.35 - 5.65 M/uL    HGB 14.4 13.0 - 16.0 g/dL    HCT 42.2 36.0 - 48.0 %    MCV 94.0 74.0 - 97.0 FL    MCH 32.1 24.0 - 34.0 PG    MCHC 34.1 31.0 - 37.0 g/dL    RDW 12.6 11.6 - 14.5 %    PLATELET 337 993 - 418 K/uL    MPV 9.7 9.2 - 11.8 FL   EKG, 12 LEAD, SUBSEQUENT    Collection Time: 07/07/21  7:28 AM   Result Value Ref Range    Ventricular Rate 64 BPM    Atrial Rate 64 BPM    P-R Interval 156 ms    QRS Duration 78 ms    Q-T Interval 398 ms    QTC Calculation (Bezet) 410 ms    Calculated P Axis 57 degrees    Calculated R Axis -3 degrees    Calculated T Axis 14 degrees    Diagnosis       Normal sinus rhythm  Low voltage QRS  Borderline ECG  When compared with ECG of 06-JUL-2021 09:19,  Criteria for Inferior infarct are no longer present  ST no longer depressed in Anterolateral leads  T wave inversion less evident in Inferior leads  Confirmed by Farooq Farnsworth MD, Rosalinda Ghosh ([id number]) on 7/7/2021 2:49:52 PM     GLUCOSE, POC    Collection Time: 07/07/21  7:48 AM   Result Value Ref Range    Glucose (POC) 150 (H) 70 - 110 mg/dL   ECHO ADULT COMPLETE    Collection Time: 07/07/21 11:31 AM   Result Value Ref Range    IVSd 1.03 (A) 0.60 - 1.00 cm    LVIDd 4.22 4.20 - 5.90 cm    LVIDs 2.84 cm    LVOT d 2.00 cm    LVPWd 1.32 (A) 0.60 - 1.00 cm    LVOT Peak Gradient 4.34 mmHg    Left Ventricular Outflow Tract Mean Gradient 2.28 mmHg    LVOT SV 67.4 mL    LVOT Peak Velocity 104.11 cm/s    LVOT VTI 21.53 cm    LA Volume 43.98 18.0 - 58.0 mL    LA Area 4C 17.26 cm2    LA Vol 2C 31.21 18.00 - 58.00 mL    LA Vol 4C 50.68 18.00 - 58.00 mL    MV A Scar 119.35 cm/s    Mitral Valve E Wave Deceleration Time 254.97 ms    MV E Scar 71.79 cm/s    AO ASC D 3.38 cm    Ao Root D 3.72 cm    MV E/A 0.60     LV Mass .0 88.0 - 224.0 g    LV Mass AL Index 80.9 49.0 - 115.0 g/m2    LA Vol Index 20.46 16.00 - 28.00 ml/m2    LA Vol Index 14.52 16.00 - 28.00 ml/m2    LA Vol Index 23.57 16.00 - 28.00 ml/m2   GLUCOSE, POC    Collection Time: 07/07/21 11:50 AM   Result Value Ref Range    Glucose (POC) 179 (H) 70 - 110 mg/dL   GLUCOSE, POC    Collection Time: 07/07/21  3:41 PM   Result Value Ref Range    Glucose (POC) 129 (H) 70 - 110 mg/dL   HGB & HCT    Collection Time: 07/07/21  4:50 PM   Result Value Ref Range    HGB 14.7 13.0 - 16.0 g/dL    HCT 42.5 36.0 - 48.0 %       Signed By: Adelina Moyer MD     July 7, 2021      I spent 35 minutes with the patient in face-to-face consultation, of which greater than 50% was spent in counseling and coordination of care as described above    Disclaimer: Sections of this note are dictated using utilizing voice recognition software. Minor typographical errors may be present. If questions arise, please do not hesitate to contact me or call our department.

## 2021-07-07 NOTE — PROGRESS NOTES
Bedside turnover given to [employer]. SBAR,MAR,ED summary given with updates R/T the night, a chance to ask questions was given. PT is on the cardiac tele monitor in stable condition. Bed is in the lowest position with the wheels locked. Call bell and personal effects  are on the bedside table within reach.

## 2021-07-08 VITALS
TEMPERATURE: 97.8 F | OXYGEN SATURATION: 96 % | WEIGHT: 220 LBS | RESPIRATION RATE: 18 BRPM | HEART RATE: 71 BPM | BODY MASS INDEX: 30.8 KG/M2 | HEIGHT: 71 IN | DIASTOLIC BLOOD PRESSURE: 82 MMHG | SYSTOLIC BLOOD PRESSURE: 136 MMHG

## 2021-07-08 LAB
ANION GAP SERPL CALC-SCNC: 7 MMOL/L (ref 3–18)
BUN SERPL-MCNC: 18 MG/DL (ref 7–18)
BUN/CREAT SERPL: 20 (ref 12–20)
CALCIUM SERPL-MCNC: 8.8 MG/DL (ref 8.5–10.1)
CHLORIDE SERPL-SCNC: 104 MMOL/L (ref 100–111)
CO2 SERPL-SCNC: 26 MMOL/L (ref 21–32)
CREAT SERPL-MCNC: 0.92 MG/DL (ref 0.6–1.3)
ERYTHROCYTE [DISTWIDTH] IN BLOOD BY AUTOMATED COUNT: 12.4 % (ref 11.6–14.5)
GLUCOSE BLD STRIP.AUTO-MCNC: 146 MG/DL (ref 70–110)
GLUCOSE SERPL-MCNC: 141 MG/DL (ref 74–99)
HCT VFR BLD AUTO: 43.4 % (ref 36–48)
HGB BLD-MCNC: 14.6 G/DL (ref 13–16)
MAGNESIUM SERPL-MCNC: 2.2 MG/DL (ref 1.6–2.6)
MCH RBC QN AUTO: 31.7 PG (ref 24–34)
MCHC RBC AUTO-ENTMCNC: 33.6 G/DL (ref 31–37)
MCV RBC AUTO: 94.3 FL (ref 74–97)
PLATELET # BLD AUTO: 219 K/UL (ref 135–420)
PMV BLD AUTO: 9.7 FL (ref 9.2–11.8)
POTASSIUM SERPL-SCNC: 3.9 MMOL/L (ref 3.5–5.5)
RBC # BLD AUTO: 4.6 M/UL (ref 4.35–5.65)
SODIUM SERPL-SCNC: 137 MMOL/L (ref 136–145)
WBC # BLD AUTO: 9 K/UL (ref 4.6–13.2)

## 2021-07-08 PROCEDURE — 83735 ASSAY OF MAGNESIUM: CPT

## 2021-07-08 PROCEDURE — 82962 GLUCOSE BLOOD TEST: CPT

## 2021-07-08 PROCEDURE — 99223 1ST HOSP IP/OBS HIGH 75: CPT | Performed by: INTERNAL MEDICINE

## 2021-07-08 PROCEDURE — 36415 COLL VENOUS BLD VENIPUNCTURE: CPT

## 2021-07-08 PROCEDURE — 74011250637 HC RX REV CODE- 250/637: Performed by: INTERNAL MEDICINE

## 2021-07-08 PROCEDURE — 80048 BASIC METABOLIC PNL TOTAL CA: CPT

## 2021-07-08 PROCEDURE — 99232 SBSQ HOSP IP/OBS MODERATE 35: CPT | Performed by: INTERNAL MEDICINE

## 2021-07-08 PROCEDURE — 85027 COMPLETE CBC AUTOMATED: CPT

## 2021-07-08 PROCEDURE — 99238 HOSP IP/OBS DSCHRG MGMT 30/<: CPT | Performed by: HOSPITALIST

## 2021-07-08 RX ORDER — AMLODIPINE BESYLATE 10 MG/1
10 TABLET ORAL DAILY
Status: DISCONTINUED | OUTPATIENT
Start: 2021-07-09 | End: 2021-07-08 | Stop reason: HOSPADM

## 2021-07-08 RX ORDER — ROSUVASTATIN CALCIUM 20 MG/1
20 TABLET, COATED ORAL
Qty: 30 TABLET | Refills: 0 | Status: SHIPPED | OUTPATIENT
Start: 2021-07-08

## 2021-07-08 RX ORDER — AMLODIPINE BESYLATE 10 MG/1
10 TABLET ORAL DAILY
Qty: 30 TABLET | Refills: 0 | Status: SHIPPED | OUTPATIENT
Start: 2021-07-09 | End: 2022-04-18 | Stop reason: SDUPTHER

## 2021-07-08 RX ORDER — METOPROLOL SUCCINATE 25 MG/1
25 TABLET, EXTENDED RELEASE ORAL DAILY
Qty: 30 TABLET | Refills: 0 | Status: SHIPPED | OUTPATIENT
Start: 2021-07-09 | End: 2022-04-18 | Stop reason: SDUPTHER

## 2021-07-08 RX ADMIN — METOPROLOL SUCCINATE 25 MG: 25 TABLET, EXTENDED RELEASE ORAL at 08:25

## 2021-07-08 RX ADMIN — AMLODIPINE BESYLATE 5 MG: 5 TABLET ORAL at 08:25

## 2021-07-08 RX ADMIN — ACETAMINOPHEN 650 MG: 325 TABLET ORAL at 07:41

## 2021-07-08 RX ADMIN — Medication 81 MG: at 08:25

## 2021-07-08 RX ADMIN — TICAGRELOR 90 MG: 90 TABLET ORAL at 08:25

## 2021-07-08 NOTE — PROGRESS NOTES
conducted an initial consultation and Spiritual Assessment for Glen Denise, who is a 62 y.o.,male. Patient's Primary Language is: [place]. According to the patient's EMR Synagogue Affiliation is: No preference. The reason the Patient came to the hospital is:   Patient Active Problem List    Diagnosis Date Noted    [facility] 07/06/2021        The  provided the following Interventions:  Initiated a relationship of care and support. Explored issues of martell, belief, spirituality and Judaism/ritual needs while hospitalized. Listened empathically. Provided chaplaincy education. Provided information about Spiritual Care Services. Chart reviewed. The following outcomes where achieved:  Patient processed feeling about current hospitalization. Assessment:  There are no spiritual or Judaism issues which require intervention at this time. Plan:  Chaplains will continue to follow and will provide pastoral care on an as needed/requested basis.     Jolly [address]   [phone]

## 2021-07-08 NOTE — DISCHARGE SUMMARY
Hospitalist Discharge Summary    Patient: Mack Cohen MRN: [id number]  CSN: [id number]    YOB: 1960  Age: 62 y.o. Sex: male    DOA: 7/6/2021 LOS:  LOS: 2 days   Discharge Date:      Admission Diagnoses: ACS (acute coronary syndrome[facility] [I24.9]    Discharge Diagnoses:    1. STEMI - EKG showed inferior ST Abnormalities  2. History of hypertension  3. History of type 2 diabetes     Discharge Condition: Good    Discharge To: Home    PHYSICAL EXAM  Visit Vitals  /82   Pulse 71   Temp 97.8 °F (36.6 °C)   Resp 18   Ht 5' 11\" (1.803 m)   Wt 99.8 kg (220 lb)   SpO2 96%   BMI 30.68 kg/m²       General: Alert, cooperative, no acute distress    HEENT: PERRLA, EOMI. Anicteric sclerae. Lungs:  CTA Bilaterally. No Wheezing/Rhonchi/Rales. Heart:  Regular rate and Rhythm. Abdomen: Soft, Non distended, Non tender. + Bowel sounds. Extremities: No edema/ cyanosis/ clubbing  Neurologic:  AA oriented X 3. Moves all extremities. HPI:  Mack Cohen is a 62 y.o. male with medical co-morbidities including HTN, dyslipidemia, type 2 DM, remote h/o tobacco smoking, obesity, presented from work after he had chest pain with associated sweating. He woke up this morning and did not feel well but he went to work where his symptom exacerbated. He stated chest pain was at mid chest 10 out of 10. Non radiate. No associate shortness of breath. No nausea/vomiting. EMS found him with sweating and EKG with elevated ST segment. CODE STEMI called in the ER. He was taken to cardiac cath lab with left heart cath to find PCI of 95% mid RCA stenosis. Stent placement and he tolerated well. Currently, in post cath lab without chest pain. No shortness of breath. Hospital Course:   50-year-old male with a history of hypertension, hyperlipidemia, type 2 diabetes, coronary artery disease presented to the emergency room with chest pain and sweating.   ER evaluation code STEMI called in the ER and patient taken to the Cath Lab, underwent cardiac catheterization with ARISTEO placement in RCA. Please see cardiac cath report for further details. Post procedure patient was stable and monitored in stepdown. Patient is currently at baseline, able to do ADLs. Patient is being discharged home is advised to closely follow-up with cardiology and his primary care physician in 1 to 2 weeks. Discharge plan discussed with patient who verbalized understanding. Consults: Cardiology    Significant Diagnostic Studies:     Imaging:  XR Results (most recent):  Results from Hospital Encounter encounter on 11/23/18    XR CHEST SNGL V    Narrative  EXAM:  Chest AP    INDICATIONS: Right thoracentesis    COMPARISON: 11/23/2018 at 11:57 AM    FINDINGS:    No pneumothorax. Small moderate right layering pleural effusion, unchanged. Normal cardiomediastinal contours. Impression  IMPRESSION:  No pneumothorax post right thoracentesis. Unchanged effusion. CT Results (most recent):  Results from Hospital Encounter encounter on 12/10/18    CT CHEST WO CONT    Narrative  CT chest without contrast.    INDICATION: Pleural effusion. TECHNIQUE: Exam without contrast.    FINDINGS: Coronary artery calcifications. Pretracheal lymph node identified  image 19 series 2 short axis measuring 10 mm. Subcarinal enlarged lymph node  short axis measuring 1.56 cm. Masslike pleural-based density identified right  lower lobe measuring approximately 8.5 x 5.4 cm image 37 series 2. Hounsfield  units are in the range of fluid. There is associated infiltrative and/or  atelectatic change in the adjacent lung. This fluid collection is loculated. Scarlike area identified right upper lobe image 23 series 3. With other adjacent  scarlike or nodular areas as well. Scattered arthritic change within the  thoracic spine. Upper abdominal structures unremarkable. Impression  IMPRESSION:  1.  Masslike density in the right lower lobe has CT readings in the range of  fluid and appears to represent a loculated fluid collection with associated  atelectatic and or infiltrative changes within the adjacent lung. 2. There is a scarlike area identified in the right upper lobe maximum dimension  1.3 x 0.9 cm. With other associated nodular and scarlike areas adjacent to it. Follow-up CT scan recommended in approximately 4-6 months. 3. Scattered arthritis within the thoracic spine. 4. Slightly prominent mediastinal lymph nodes as described above. 07/06/21    ECHO ADULT COMPLETE 07/07/2021 7/7/2021    Interpretation Summary  · Contrast used: DEFINITY. · LV: Estimated LVEF is 55 - 60%. Visually measured ejection fraction. Normal cavity size and systolic function (ejection fraction normal). Mild concentric hypertrophy. Wall motion: normal. Age-appropriate left ventricular diastolic function. · AO: Mild aortic root dilatation. (3.7 cm)    Signed by: Dorcas Mohs, MD on 7/7/2021 12:04 PM         Procedures:   Cardiac cath   Left main trunk; patent     LAD;   20% mid vessel stenosis     Circumflex;   mild luminal irregularities     RCA;   99% mid vessel stenosis. Dominant vessel.     Ventriculogram;   normal wall motion and systolic function. EF 55 to 60%     Catheter intervention; mid RCA stented with a 3.0/15 mm Xience stent. The stent was postdilated with a noncompliant 3.0/15 mm balloon. There was CHAIM-3 flow and 0% residual.  Patient tolerated the procedure well. Discharge Medications:     Current Discharge Medication List      START taking these medications    Details   rosuvastatin (CRESTOR) 20 mg tablet Take 1 Tablet by mouth nightly. Qty: 30 Tablet, Refills: 0      metoprolol succinate (TOPROL-XL) 25 mg XL tablet Take 1 Tablet by mouth daily. Qty: 30 Tablet, Refills: 0      ticagrelor (BRILINTA) 90 mg tablet Take 1 Tablet by mouth two (2) times a day.   Qty: 60 Tablet, Refills: 0         CONTINUE these medications which have CHANGED Details   amLODIPine (NORVASC) 10 mg tablet Take 1 Tablet by mouth daily. Qty: 30 Tablet, Refills: 0         CONTINUE these medications which have NOT CHANGED    Details   lisinopril (PRINIVIL, ZESTRIL) 40 mg tablet Take 40 mg by mouth daily. glimepiride (AMARYL) 4 mg tablet Take 4 mg by mouth two (2) times a day. empagliflozin (Jardiance) 25 mg tablet Take 25 mg by mouth daily. metFORMIN (GLUCOPHAGE) 500 mg tablet Take 500 mg by mouth daily (with dinner). sAXagliptin (ONGLYZA) 5 mg tab tablet Take 5 mg by mouth daily. colchicine (MITIGARE) 0.6 mg capsule Take 0.6 mg by mouth daily. aspirin delayed-release 81 mg tablet Take 81 mg by mouth daily.          STOP taking these medications       atorvastatin (LIPITOR) 40 mg tablet Comments:   Reason for Stopping:               Current Facility-Administered Medications:     [START ON 7/9/2021] amLODIPine (NORVASC) tablet 10 mg, 10 mg, Oral, DAILY, Rena Pritchett PA-C    rosuvastatin (CRESTOR) tablet 20 mg, 20 mg, Oral, QHS, Venkatesh Mckinnon MD, 20 mg at 07/07/21 2151    metoprolol succinate (TOPROL-XL) XL tablet 25 mg, 25 mg, Oral, DAILY, Venkatesh Mckinnon MD, 25 mg at 07/08/21 0825    sodium chloride (NS) flush 5-40 mL, 5-40 mL, IntraVENous, Q8H, Tracie Allen MD, 10 mL at 07/07/21 1532    sodium chloride (NS) flush 5-40 mL, 5-40 mL, IntraVENous, PRN, Tracie Allen MD    aspirin delayed-release tablet 81 mg, 81 mg, Oral, DAILY, Tracie Allen MD, 81 mg at 07/08/21 0825    ticagrelor (BRILINTA) tablet 90 mg, 90 mg, Oral, BID, Tracie Allen MD, 90 mg at 07/08/21 0825    sodium chloride (NS) flush 5-40 mL, 5-40 mL, IntraVENous, PRN, Flaquita Chauhan MD    acetaminophen (TYLENOL) tablet 650 mg, 650 mg, Oral, Q6H PRN, 650 mg at 07/08/21 0741 **OR** acetaminophen (TYLENOL) suppository 650 mg, 650 mg, Rectal, Q6H PRN, Flaquita Chauhan MD    polyethylene glycol (MIRALAX) packet 17 g, 17 g, Oral, DAILY PRN, Shan Carmen, Oneyda, MD    promethazine (PHENERGAN) tablet 12.5 mg, 12.5 mg, Oral, Q6H PRN **OR** ondansetron (ZOFRAN) injection 4 mg, 4 mg, IntraVENous, Q6H PRN, Cecelia Darnell MD    insulin lispro (HUMALOG) injection, , SubCUTAneous, AC&HS, Cecelia Darnell MD, 2 Units at 07/07/21 1217    glucose chewable tablet 16 g, 4 Tablet, Oral, PRN, Cecelia Darnell MD    glucagon (GLUCAGEN) injection 1 mg, 1 mg, IntraMUSCular, PRN, Cecelia Darnell MD    dextrose (D50W) injection syrg 12.5-25 g, 25-50 mL, IntraVENous, PRN, Cecelia Darnell MD    morphine injection 1 mg, 1 mg, IntraVENous, Q1H PRN, Briana Gibbs MD     Activity: Activity as tolerated    Diet: Cardiac Diet    Wound Care: None needed      Augustine Copeland MD  7/8/2021, 10:43 AM

## 2021-07-08 NOTE — PROGRESS NOTES
Discharge order noted for today. Orders reviewed. Brilinta prescription to be filled here at the [facility] pharmacy. Discount card on the chart. No additional needs identified at this time. Pt to be transported home by his wife.  remains available if needed.      Jian Powell RN BSN  Care Manager  [phone]

## 2021-07-08 NOTE — ROUTINE PROCESS
7033 Received report from [facility] [place]. Patient alert and oriented x 4. Complaint of back pain/ uncomfortable. Right groin sight no changes from yesterday - intact dressing and some bruises. 0745 Tylenol given for back discomfort. 1000 Patient seen by Cardiology and cleared for discharge. Informed Dr Rosa Elena Alanis. 1100 Discharge order noted. 1145 Discharge instruction given. Patient advised on activity restriction. Advised to  prescriptions at his Pharmacy. Instructed to follow-up with PCP and Cardiology as scheduled. Patient and wife verbalized understanding.

## 2021-07-08 NOTE — PROGRESS NOTES
Bedside turnover given from [employer]. SBAR,MAR,ED summary given with updates R/T the night, a chance to ask questions was given. PT is on the cardiac tele monitor in stable condition. Bed is in the lowest position with the wheels locked. Call bell and personal effects  are on the bedside table within reach. Patient resting comfortably. Whiteboard updated.

## 2021-07-08 NOTE — DISCHARGE INSTRUCTIONS
Cardiac Catheterization/Angiography Discharge Instructions    *Check the puncture site frequently for swelling or bleeding. If you see any bleeding, lie down and apply pressure over the area with a clean towel or washcloth. Notify your doctor for any redness, swelling, drainage or oozing from the puncture site. Notify your doctor for any fever or chills. *If the leg or arm with the puncture becomes cold, numb or painful, call Dr Carlo Maxwell at  [phone]    *Activity should be limited for the next 48 hours. Climb stairs as little as possible and avoid any stooping, bending or strenuous activity for 48 hours. No heavy lifting (anything over 10 pounds) for three days. *Do not drive for 48 hours. *You may resume your usual diet. Drink more fluids than usual.    *Have a responsible person drive you home and stay with you for at least 24 hours after your heart catheterization/angiography. *You may remove the bandage from your Right and Groin in 24 hours. You may shower in 24 hours. No tub baths, hot tubs or swimming for one week. Do not place any lotions, creams, powders, ointments over the puncture site for one week. You may place a clean band-aid over the puncture site each day for 5 days. Change this daily. Patient Education        Coronary Angiogram: What to Expect at Home  Your Recovery    A coronary angiogram is a test to examine the large blood vessel of your heart (coronary artery). The doctor inserted a thin, flexible tube (catheter) into a blood vessel in your groin. In some cases, the catheter is placed in a blood vessel in the arm. Your groin or arm may have a bruise and feel sore for a day or two after the procedure. You can do light activities around the house but nothing strenuous for several days. This care sheet gives you a general idea about how long it will take for you to recover. But each person recovers at a different pace.  Follow the steps below to feel better as quickly as possible. How can you care for yourself at home? Activity    · If the doctor gave you a sedative:  ? For 24 hours, don't do anything that requires attention to detail, such as going to work, making important decisions, or signing any legal documents. It takes time for the medicine's effects to completely wear off.  ? For your safety, do not drive or operate any machinery that could be dangerous. Wait until the medicine wears off and you can think clearly and react easily.     · Do not do strenuous exercise and do not lift, pull, or push anything heavy until your doctor says it is okay. This may be for a day or two. You can walk around the house and do light activity, such as cooking.     · If the catheter was placed in your groin, try not to walk up stairs for the first couple of days.     · If the catheter was placed in your arm near your wrist, do not bend your wrist deeply for the first couple of days. Be careful using your hand to get into and out of a chair or bed.     · If your doctor recommends it, get more exercise. Walking is a good choice. Bit by bit, increase the amount you walk every day. Try for at least 30 minutes on most days of the week. Diet    · Drink plenty of fluids to help your body flush out the dye. If you have kidney, heart, or liver disease and have to limit fluids, talk with your doctor before you increase the amount of fluids you drink.     · Keep eating a heart-healthy diet that has lots of fruits, vegetables, and whole grains. If you have not been eating this way, talk to your doctor. You also may want to talk to a dietitian. This expert can help you to learn about healthy foods and plan meals. Medicines    · Your doctor will tell you if and when you can restart your medicines. He or she will also give you instructions about taking any new medicines.     · If you take aspirin or some other blood thinner, ask your doctor if and when to start taking it again.  Make sure that you understand exactly what your doctor wants you to do.     · Your doctor may prescribe a blood-thinning medicine like aspirin or clopidogrel (Plavix). It is very important that you take these medicines exactly as directed in order to keep the coronary artery open and reduce your risk of a heart attack. Be safe with medicines. Call your doctor if you think you are having a problem with your medicine. Care of the catheter site    · For 1 or 2 days, keep a bandage over the spot where the catheter was inserted. The bandage probably will fall off in this time.     · Put ice or a cold pack on the area for 10 to 20 minutes at a time to help with soreness or swelling. Put a thin cloth between the ice and your skin.     · You may shower 24 to 48 hours after the procedure, if your doctor okays it. Pat the incision dry.     · Do not soak the catheter site until it is healed. Don't take a bath for 1 week, or until your doctor tells you it is okay.     · Watch for bleeding from the site. A small amount of blood (up to the size of a quarter) on the bandage can be normal.     · If you are bleeding, lie down and press on the area for 15 minutes to try to make it stop. If the bleeding does not stop, call your doctor or seek immediate medical care. Follow-up care is a key part of your treatment and safety. Be sure to make and go to all appointments, and call your doctor if you are having problems. It's also a good idea to know your test results and keep a list of the medicines you take. When should you call for help? Call 911 anytime you think you may need emergency care. For example, call if:    · You passed out (lost consciousness).     · You have severe trouble breathing.     · You have sudden chest pain and shortness of breath, or you cough up blood.     · You have symptoms of a heart attack. These may include:  ? Chest pain or pressure, or a strange feeling in the chest.  ? Sweating. ?  Shortness of breath. ? Nausea or vomiting. ? Pain, pressure, or a strange feeling in the back, neck, jaw, or upper belly, or in one or both shoulders or arms. ? Lightheadedness or sudden weakness. ? A fast or irregular heartbeat. After you call 911, the  may tel you to chew 1 adult-strength or 2 to 4 low-dose aspirin. Wait for an ambulance. Do not try to drive yourself.     · You have been diagnosed with angina, and you have symptoms that do not go away with rest or are not getting better within 5 minutes after you take a dose of nitroglycerin. Call your doctor now or seek immediate medical care if:    · You are bleeding from the area where the catheter was put in your artery.     · You have a fast-growing, painful lump at the catheter site.     · You have signs of infection, such as:  ? Increased pain, swelling, warmth, or redness. ? Red streaks leading from the catheter site. ? Pus draining from the catheter site. ? A fever.     · Your leg, arm, or hand is painful, looks blue, or feels cold, numb, or tingly. Watch closely for changes in your health, and be sure to contact your doctor if you have any problems. Where can you learn more? Go to http://www.gray.com/  Enter D192 in the search box to learn more about \"Coronary Angiogram: What to Expect at Home. \"  Current as of: August 31, 2020               Content Version: 12.8  © 3732-6334 [employer]. Care instructions adapted under license by [employer] (which disclaims liability or warranty for this information). If you have questions about a medical condition or this instruction, always ask your healthcare professional. [place] [address] any warranty or liability for your use of this information.

## 2021-07-08 NOTE — PROGRESS NOTES
Cardiology Progress Note      Attending Cardiologist: Dr. Costello Show:     Hospital Problems  Never Reviewed        Codes Class Noted POA    * (Principal) ACS (acute coronary syndrome) ([address] ICD-10-CM: I24.9  ICD-9-CM: 411.1  7/6/2021 Unknown            -Presented with Unstable angina, stuttering CP for 2-3 weeks, with associated diaphoresis and dizziness. ECG with inferior ST wave abnormalities. CP was improved with SLN and ASA, but not completely resolved. Patient was taken to the cath lab urgently  for intervention. High-grade mid RCA stenosis which underwent PCI with a drug-eluting stent  -CAD, s/p [facility] with PCI to RCA with a ARISTEO (07/06/21)   -Hypertension.  -Diabetes mellitus.  -Dyslipidemia. -H/o remote tobacco use. -H/o family history of CAD, unclear specifics. No primary cardiologist. Will establish care with Dr. Lalita Pratt. Plan:     Independently seen and evaluated. Agree with below. He is able to ambulate around the jhaveri without difficulty. He had mid severe RCA lesion stented to residual 0%. His symptoms have resolved. He will need outpatient follow-up for proximal RCA lesion which is to be followed clinically. Okay to discharge home from cardiac standpoint. -H/H remained stable overnight. Ecchymosis noted at right groin access site. Patient states groin and flank pain have resolved. Pressures remain stable. -Continued on ASA, Brilinta and statin.   -Continue Metoprolol.   -Will increase amlodipine for better BP optimization.   -Plan to follow up with Dr. Lalita Pratt in the office in 3-4 weeks.   -Will have patient ambulate this morning. If no issues/symptoms, patient can be discharged home from a CV standpoint. Subjective:     Reports resolution of right groin and flank pain. Denies CP or SOB. Wants to go home. Past Medical History:   No past medical history on file.       Social History:     Social History     Socioeconomic History    Marital status:  Spouse name: Not on file    Number of children: Not on file    Years of education: Not on file    Highest education level: Not on file     Social Determinants of Health     Financial Resource Strain:     Difficulty of Paying Living Expenses:    Food Insecurity:     Worried About Running Out of Food in the Last Year:     [address] in the Last Year:    Transportation Needs:     Lack of Transportation (Medical):  Lack of Transportation (Non-Medical):    Physical Activity:     Days of Exercise per Week:     Minutes of Exercise per Session:    Stress:     Feeling of Stress :    Social Connections:     Frequency of Communication with Friends and Family:     Frequency of Social Gatherings with Friends and Family:     Attends [employer] Services:     Active Member of Clubs or Organizations:     Attends Club or Organization Meetings:     Marital Status:         Family History:   No family history on file.      Medications:   No Known Allergies     Current Facility-Administered Medications   Medication Dose Route Frequency    [START ON 7/9/2021] amLODIPine (NORVASC) tablet 10 mg  10 mg Oral DAILY    rosuvastatin (CRESTOR) tablet 20 mg  20 mg Oral QHS    metoprolol succinate (TOPROL-XL) XL tablet 25 mg  25 mg Oral DAILY    sodium chloride (NS) flush 5-40 mL  5-40 mL IntraVENous Q8H    sodium chloride (NS) flush 5-40 mL  5-40 mL IntraVENous PRN    aspirin delayed-release tablet 81 mg  81 mg Oral DAILY    ticagrelor (BRILINTA) tablet 90 mg  90 mg Oral BID    sodium chloride (NS) flush 5-40 mL  5-40 mL IntraVENous PRN    acetaminophen (TYLENOL) tablet 650 mg  650 mg Oral Q6H PRN    Or    acetaminophen (TYLENOL) suppository 650 mg  650 mg Rectal Q6H PRN    polyethylene glycol (MIRALAX) packet 17 g  17 g Oral DAILY PRN    promethazine (PHENERGAN) tablet 12.5 mg  12.5 mg Oral Q6H PRN    Or    ondansetron (ZOFRAN) injection 4 mg  4 mg IntraVENous Q6H PRN    insulin lispro (HUMALOG) injection SubCUTAneous AC&HS    glucose chewable tablet 16 g  4 Tablet Oral PRN    glucagon (GLUCAGEN) injection 1 mg  1 mg IntraMUSCular PRN    dextrose (D50W) injection syrg 12.5-25 g  25-50 mL IntraVENous PRN    morphine injection 1 mg  1 mg IntraVENous Q1H PRN         Physical Exam:     Visit Vitals  /82   Pulse 71   Temp 97.8 °F (36.6 °C)   Resp 18   Ht 5' 11\" (1.803 m)   Wt 99.8 kg (220 lb)   SpO2 96%   BMI 30.68 kg/m²       TELE: SR    BP Readings from Last 3 Encounters:   07/08/21 136/82   11/23/18 113/67     Pulse Readings from Last 3 Encounters:   07/08/21 71   11/23/18 82     Wt Readings from Last 3 Encounters:   07/08/21 99.8 kg (220 lb)   11/23/18 95.3 kg (210 lb)       General:  No distress, appears stated age  Neck:  no JVD  Lungs:  clear to auscultation bilaterally  Heart:  regular rate and rhythm  Abdomen:  abdomen is soft without significant tenderness, masses, organomegaly or guarding  Extremities:  extremities normal, atraumatic, no cyanosis or edema; right groin with ecchymosis. No Bruit appreciated, distal pulses intact. Data Review:     Recent Labs     07/08/21  0510 07/07/21  1650 07/07/21  0522 07/06/21  0925 07/06/21  0925   WBC 9.0  --  8.6  --  7.0   HGB 14.6 14.7 14.4   < > 14.8   HCT 43.4 42.5 42.2   < > 42.8     --  218  --  242    < > = values in this interval not displayed.      Recent Labs     07/08/21  0510 07/07/21  0522 07/06/21  0925    138 134*   K 3.9 3.9 3.9    107 102   CO2 26 25 20*   * 138* 242*   BUN 18 20* 23*   CREA 0.92 0.97 1.44*   CA 8.8 8.7 9.2   MG 2.2 2.1 2.1   ALB  --   --  4.1   ALT  --   --  108*   INR  --   --  1.0       Results for orders placed or performed during the hospital encounter of 07/06/21   EKG, 12 LEAD, INITIAL   Result Value Ref Range    Ventricular Rate 75 BPM    Atrial Rate 75 BPM    P-R Interval 146 ms    QRS Duration 90 ms    Q-T Interval 396 ms    QTC Calculation (Bezet) 442 ms    Calculated P Axis 6 degrees Calculated R Axis 8 degrees    Calculated T Axis -49 degrees    Diagnosis       Normal sinus rhythm  Inferior infarct , age undetermined  Abnormal ECG  No previous ECGs available  Confirmed by Lily Schaffer MD, ----- ([id number]) on 7/6/2021 6:52:12 PM         All Cardiac Markers in the last 24 hours:    No results found for: CPK, CK, CKMMB, CKMB, RCK3, CKMBT, CKNDX, CKND1, BETTINA, TROPT, TROIQ, CHANDU, TROPT, TNIPOC, BNP, BNPP    Last Lipid:  No results found for: CHOL, CHOLX, CHLST, CHOLV, HDL, HDLP, LDL, LDLC, DLDLP, TGLX, TRIGL, TRIGP, CHHD, CHHDX    Cardiographics:     EKG Results     Procedure 720 Value Units Date/Time    EKG, 12 LEAD, SUBSEQUENT [[id number]] Collected: 07/07/21 0728    Order Status: Completed Updated: 07/07/21 1450     Ventricular Rate 64 BPM      Atrial Rate 64 BPM      P-R Interval 156 ms      QRS Duration 78 ms      Q-T Interval 398 ms      QTC Calculation (Bezet) 410 ms      Calculated P Axis 57 degrees      Calculated R Axis -3 degrees      Calculated T Axis 14 degrees      Diagnosis --     Normal sinus rhythm  Low voltage QRS  Borderline ECG  When compared with ECG of 06-JUL-2021 09:19,  Criteria for Inferior infarct are no longer present  ST no longer depressed in Anterolateral leads  T wave inversion less evident in Inferior leads  Confirmed by Judith Martínez MD, Be Renteria ([id number]) on 7/7/2021 2:49:52 PM      EKG, 12 LEAD, INITIAL [[id number]] Collected: 07/06/21 0919    Order Status: Completed Updated: 07/06/21 1852     Ventricular Rate 75 BPM      Atrial Rate 75 BPM      P-R Interval 146 ms      QRS Duration 90 ms      Q-T Interval 396 ms      QTC Calculation (Bezet) 442 ms      Calculated P Axis 6 degrees      Calculated R Axis 8 degrees      Calculated T Axis -49 degrees      Diagnosis --     Normal sinus rhythm  Inferior infarct , age undetermined  Abnormal ECG  No previous ECGs available  Confirmed by Lily Schaffer MD, ----- ([id number]) on 7/6/2021 6:52:12 PM      EKG, 12 LEAD, SUBSEQUENT [[id number]]     Order Status: Canceled                   XR Results (most recent):  Results from Hospital Encounter encounter on 11/23/18    XR CHEST SNGL V    Narrative  EXAM:  Chest AP    INDICATIONS: Right thoracentesis    COMPARISON: 11/23/2018 at 11:57 AM    FINDINGS:    No pneumothorax. Small moderate right layering pleural effusion, unchanged. Normal cardiomediastinal contours. Impression  IMPRESSION:  No pneumothorax post right thoracentesis. Unchanged effusion.         Signed By: Eliana Wilkes PA-C     July 8, 2021

## 2021-07-13 ENCOUNTER — TELEPHONE (OUTPATIENT)
Dept: CARDIAC REHAB | Age: 61
End: 2021-07-13

## 2021-07-13 NOTE — TELEPHONE ENCOUNTER
[facility] called patient and left a message about the program. Additional attempts at contact will be made.     Thank you,  Blake Lombardo

## 2021-07-20 NOTE — PROGRESS NOTES
Patient called, at Dr. Natalie Maria office, Pcp, patient is in need of AVS for his visit, verified patient's identifiers. Information faxed to patient for his Pcp to review.

## 2021-08-04 ENCOUNTER — TELEPHONE (OUTPATIENT)
Dept: CARDIAC REHAB | Age: 61
End: 2021-08-04

## 2021-08-04 NOTE — TELEPHONE ENCOUNTER
[facility] called patient and left a message on his cell. Additional attempts at contact will be made.     Thank you,  Demetrice Qureshi

## 2021-08-06 ENCOUNTER — TELEPHONE (OUTPATIENT)
Dept: CARDIOLOGY CLINIC | Age: 61
End: 2021-08-06

## 2021-08-06 ENCOUNTER — OFFICE VISIT (OUTPATIENT)
Dept: CARDIOLOGY CLINIC | Age: 61
End: 2021-08-06
Payer: COMMERCIAL

## 2021-08-06 VITALS
OXYGEN SATURATION: 98 % | DIASTOLIC BLOOD PRESSURE: 83 MMHG | SYSTOLIC BLOOD PRESSURE: 137 MMHG | RESPIRATION RATE: 16 BRPM

## 2021-08-06 DIAGNOSIS — I21.3 ST ELEVATION MYOCARDIAL INFARCTION (STEMI), UNSPECIFIED ARTERY (HCC): ICD-10-CM

## 2021-08-06 DIAGNOSIS — E11.8 CONTROLLED TYPE 2 DIABETES MELLITUS WITH COMPLICATION, WITHOUT LONG-TERM CURRENT USE OF INSULIN (HCC): ICD-10-CM

## 2021-08-06 DIAGNOSIS — E78.5 DYSLIPIDEMIA: ICD-10-CM

## 2021-08-06 DIAGNOSIS — I24.9 ACS (ACUTE CORONARY SYNDROME) (HCC): Primary | ICD-10-CM

## 2021-08-06 DIAGNOSIS — Z91.89 QUIT USING TOBACCO IN REMOTE PAST: ICD-10-CM

## 2021-08-06 DIAGNOSIS — I10 ESSENTIAL HYPERTENSION: ICD-10-CM

## 2021-08-06 DIAGNOSIS — Z95.5 STATUS POST INSERTION OF DRUG ELUTING CORONARY ARTERY STENT: ICD-10-CM

## 2021-08-06 PROCEDURE — 99214 OFFICE O/P EST MOD 30 MIN: CPT | Performed by: INTERNAL MEDICINE

## 2021-08-06 NOTE — PROGRESS NOTES
Sky Wall presents today for No chief complaint on file. Sky Wall preferred language for health care discussion is english/other. Personal Protective Equipment:   Personal Protective Equipment was used including: mask-surgical and hands-gloves. Patient was placed on no precaution(s). Patient was masked. Precautions:   Patient currently on None  Patient currently roomed with door closed    Is someone accompanying this pt? no    Is the patient using any DME equipment during OV? no    Depression Screening:  No flowsheet data found. Learning Assessment:  No flowsheet data found. Abuse Screening:  No flowsheet data found. Fall Risk  No flowsheet data found. Pt currently taking Anticoagulant therapy? no    Coordination of Care:  1. Have you been to the ER, urgent care clinic since your last visit? Hospitalized since your last visit? no    2. Have you seen or consulted any other health care providers outside of the [address] [place] [address] since your last visit? Include any pap smears or colon screening.  no

## 2021-08-06 NOTE — PATIENT INSTRUCTIONS
Testing    Nuclear Stress    Nuclear Stress Instructions-Nothing to eat or drink past midnight and no medicaitons the morning of cardiac testing.     **call office 3-5 days after testing is completed for results**

## 2021-08-16 PROBLEM — Z95.5 STATUS POST INSERTION OF DRUG ELUTING CORONARY ARTERY STENT: Status: ACTIVE | Noted: 2021-08-16

## 2021-08-16 PROBLEM — I10 ESSENTIAL HYPERTENSION: Status: ACTIVE | Noted: 2021-08-16

## 2021-08-16 PROBLEM — E78.5 DYSLIPIDEMIA: Status: ACTIVE | Noted: 2021-08-16

## 2021-08-16 PROBLEM — E11.8 CONTROLLED TYPE 2 DIABETES MELLITUS WITH COMPLICATION, WITHOUT LONG-TERM CURRENT USE OF INSULIN (HCC): Status: ACTIVE | Noted: 2021-08-16

## 2021-08-16 PROBLEM — Z91.89 QUIT USING TOBACCO IN REMOTE PAST: Status: ACTIVE | Noted: 2021-08-16

## 2021-08-16 NOTE — PROGRESS NOTES
Subjective:      Quincy Estevez is in the office today for cardiac evaluation. He is a 78-year-old man that was hospitalized at [facility] in early July with chest pain and ECG changes consistent with a STEMI. He was brought emergently to the cardiac catheterization lab where he had stenting of the mid RCA. The patient has noticed that over the last couple of weeks, he has had some episodic chest discomfort. It was different from his STEMI pain. He localized the discomfort to the right peristernal area. At times, it lasts throughout the day. At the time of his STEMI, he had a sensation that someone was \"kneeling on my chest \". Patient's cardiac risk factors are remote smoking/ tobacco exposure, dyslipidemia, diabetes mellitus, hypertension. Patient Active Problem List    Diagnosis Date Noted    Status post insertion of drug eluting coronary artery stent 08/16/2021    Essential hypertension 08/16/2021    Controlled type 2 diabetes mellitus with complication, without long-term current use of insulin ([facility] 08/16/2021    Dyslipidemia 08/16/2021    Quit using tobacco in remote past 08/16/2021    ACS (acute coronary syndrome) ([facility] 07/06/2021     Current Outpatient Medications   Medication Sig Dispense Refill    amLODIPine (NORVASC) 10 mg tablet Take 1 Tablet by mouth daily. 30 Tablet 0    rosuvastatin (CRESTOR) 20 mg tablet Take 1 Tablet by mouth nightly. 30 Tablet 0    metoprolol succinate (TOPROL-XL) 25 mg XL tablet Take 1 Tablet by mouth daily. 30 Tablet 0    ticagrelor (BRILINTA) 90 mg tablet Take 1 Tablet by mouth two (2) times a day. 60 Tablet 0    lisinopril (PRINIVIL, ZESTRIL) 40 mg tablet Take 40 mg by mouth daily.  glimepiride (AMARYL) 4 mg tablet Take 4 mg by mouth two (2) times a day.  empagliflozin (Jardiance) 25 mg tablet Take 25 mg by mouth daily.  metFORMIN (GLUCOPHAGE) 500 mg tablet Take 500 mg by mouth daily (with dinner).       sAXagliptin (ONGLYZA) 5 mg tab tablet Take 5 mg by mouth daily.  colchicine (MITIGARE) 0.6 mg capsule Take 0.6 mg by mouth daily.  aspirin delayed-release 81 mg tablet Take 81 mg by mouth daily. Allergies   Allergen Reactions    Hydrocodone-Acetaminophen Other (comments)     No past medical history on file. No past surgical history on file. No family history on file. Social History     Tobacco Use   Smoking Status Not on file          Review of Systems, additional:  Constitutional: negative  Eyes: negative  Respiratory: negative  Cardiovascular: positive for chest pain  Gastrointestinal: negative  Musculoskeletal:negative  Neurological: negative  Behvioral/Psych: negative  Endocrine: negative  ENT: negative    Objective:     Visit Vitals  /83 (BP 1 Location: Left upper arm, BP Patient Position: Sitting, BP Cuff Size: Adult)   Resp 16   SpO2 98%     General:  alert, cooperative, no distress   Chest Wall: inspection normal - no chest wall deformities or tenderness, respiratory effort normal   Lung: clear to auscultation bilaterally   Heart:  normal rate and regular rhythm, S1 and S2 normal, no murmurs noted, no gallops noted, no JVD   Abdomen: soft, non-tender. Bowel sounds normal. No masses,  no organomegaly   Extremities: extremities normal, atraumatic, no cyanosis or edema Skin: no rashes   Neuro: alert, oriented, normal speech, no focal findings or movement disorder noted     EK2021. Sinus rhythm. Cannot exclude prior inferior wall myocardial infarction. Assessment/Plan:       ICD-10-CM ICD-9-CM    1. ACS (acute coronary syndrome) ([facility])  I24.9 411.1 NUCLEAR CARDIAC STRESS TEST   2. ST elevation myocardial infarction (STEMI), unspecified artery ([facility])  I21.3 410.90 NUCLEAR CARDIAC STRESS TEST   3. Status post insertion of drug eluting coronary artery stent , 2021, RCA. Patient has had some ongoing right peristernal chest discomfort which lasts \"throughout the day \"at times.   He reports it is different from his pain at the time of his ACS. Will order nuclear stress test.  Return in 3 to 4 weeks Z95.5 V45.82    4. Quit using tobacco in remote past , none since 2017. Z91.89 V15.89    5. Dyslipidemia , Crestor 20 mg daily. Will need lipid profile early to mid September 2021 E78.5 272.4    6. Controlled type 2 diabetes mellitus with complication, without long-term current use of insulin (HCC)  E11.8 250.90    7. Essential hypertension , controlled in the office today.  I10 401.9

## 2022-03-18 PROBLEM — I10 ESSENTIAL HYPERTENSION: Status: ACTIVE | Noted: 2021-08-16

## 2022-03-18 PROBLEM — E78.5 DYSLIPIDEMIA: Status: ACTIVE | Noted: 2021-08-16

## 2022-03-19 PROBLEM — E11.8 CONTROLLED TYPE 2 DIABETES MELLITUS WITH COMPLICATION, WITHOUT LONG-TERM CURRENT USE OF INSULIN (HCC): Status: ACTIVE | Noted: 2021-08-16

## 2022-03-19 PROBLEM — Z95.5 STATUS POST INSERTION OF DRUG ELUTING CORONARY ARTERY STENT: Status: ACTIVE | Noted: 2021-08-16

## 2022-03-19 PROBLEM — Z91.89 QUIT USING TOBACCO IN REMOTE PAST: Status: ACTIVE | Noted: 2021-08-16

## 2022-03-19 PROBLEM — I24.9 ACS (ACUTE CORONARY SYNDROME) (HCC): Status: ACTIVE | Noted: 2021-07-06

## 2022-04-18 ENCOUNTER — OFFICE VISIT (OUTPATIENT)
Dept: CARDIOLOGY CLINIC | Age: 62
End: 2022-04-18
Payer: COMMERCIAL

## 2022-04-18 VITALS
HEART RATE: 68 BPM | BODY MASS INDEX: 30.96 KG/M2 | OXYGEN SATURATION: 99 % | SYSTOLIC BLOOD PRESSURE: 112 MMHG | DIASTOLIC BLOOD PRESSURE: 64 MMHG | WEIGHT: 222 LBS | TEMPERATURE: 97.9 F

## 2022-04-18 DIAGNOSIS — I24.9 ACS (ACUTE CORONARY SYNDROME) (HCC): Primary | ICD-10-CM

## 2022-04-18 PROCEDURE — 99214 OFFICE O/P EST MOD 30 MIN: CPT | Performed by: INTERNAL MEDICINE

## 2022-04-18 NOTE — PATIENT INSTRUCTIONS
New Medication/Medication Changes  Increase toprol 25 mg to twice daily   Decrease norvasc to 5 mg daily   **please allow 24-48 hrs for medication to be escribed to pharmacy** If you need any refills on medications please contact your pharmacy so that the request can be escribed to the provider for review.

## 2022-04-18 NOTE — PROGRESS NOTES
Identified pt with two pt identifiers(name and ). Reviewed record in preparation for visit and have obtained necessary documentation. Farhan Fernandez presents today for   Chief Complaint   Patient presents with    Follow-up       Pt c/o DIZZINESS, SOB, CHEST PAIN/ PRESSURE. Farhan Fernandez preferred language for health care discussion is english/other. Personal Protective Equipment:   Personal Protective Equipment was used including: mask-surgical and hands-gloves. Patient was placed on no precaution(s). Patient was masked. Precautions:   Patient currently on None  Patient currently roomed with door closed. Is someone accompanying this pt? no    Is the patient using any DME equipment during OV? no    Depression Screening:  No flowsheet data found. Learning Assessment:  No flowsheet data found. Abuse Screening:  No flowsheet data found. Fall Risk  No flowsheet data found. Pt currently taking Anticoagulant therapy? no  Pt currently taking Antiplatelet therapy? no    Coordination of Care:  1. Have you been to the ER, urgent care clinic since your last visit? Hospitalized since your last visit? no    2. Have you seen or consulted any other health care providers outside of the [address] [place] [address] since your last visit? Include any pap smears or colon screening. no      Please see Red banners under Allergies and Med Rec to remove outside inquires. All correct information has been verified with patient and added to chart.      Medication's patient's would liked removed has been marked not taking to be removed per Verbal order and read back per Marisol Gomez MD

## 2022-04-18 NOTE — TELEPHONE ENCOUNTER
PCP: Ayana Simmons MD    Last appt: 4/18/2022  No future appointments. Requested Prescriptions     Pending Prescriptions Disp Refills    amLODIPine (NORVASC) 5 mg tablet 90 Tablet 3     Sig: Take 1 Tablet by mouth daily.  metoprolol succinate (TOPROL-XL) 25 mg XL tablet 60 Tablet 5     Sig: Take 1 Tablet by mouth two (2) times a day.

## 2022-04-20 ENCOUNTER — TELEPHONE (OUTPATIENT)
Dept: CARDIOLOGY CLINIC | Age: 62
End: 2022-04-20

## 2022-04-20 RX ORDER — METOPROLOL SUCCINATE 25 MG/1
25 TABLET, EXTENDED RELEASE ORAL 2 TIMES DAILY
Qty: 60 TABLET | Refills: 5 | Status: SHIPPED | OUTPATIENT
Start: 2022-04-20

## 2022-04-20 RX ORDER — AMLODIPINE BESYLATE 5 MG/1
5 TABLET ORAL DAILY
Qty: 90 TABLET | Refills: 3 | Status: SHIPPED | OUTPATIENT
Start: 2022-04-20

## 2022-04-20 NOTE — Clinical Note
4/22/2022 3:15 PM    Lakeisha Land Charmaine  [address]  [address] [place] [address]              Sincerely,      Wilton Rasmussen MD

## 2022-04-20 NOTE — Clinical Note
NOTIFICATION RETURN TO WORK / SCHOOL    4/22/2022 3:17 PM    Mr. Salty Grossman  [address]      To Whom It May Concern:    Salty Grossman is currently under the care of CARDIO SPECIALIST AT [facility]. He will return to work/school on: ***    If there are questions or concerns please have the patient contact our office.         Sincerely,      Chucho Keita MD

## 2022-04-20 NOTE — TELEPHONE ENCOUNTER
Attempted to contact pt at  number, not home. Lm with wife for pt to return call to office at [phone]. Will continue to try to contact pt.

## 2022-04-20 NOTE — LETTER
4/22/2022    Mr. Browne Read  [address]  [address] [facility] [address]    Instructions    Pre procedure labs(CBC, CMP, PT/INR) to be completed within 3-5 days prior to procedure. Covid testing must be completed 48 prior to procedure. Labs drawn in [address] [place] [address]. Their hours of operation are Monday through Friday 7am-3:30 pm.  If you have and questions regarding directions please contact them at [phone]. Patients Name:  Pavan Stewart    1. You are scheduled to have a left heart catherization on May 5, 2022 at 9:15 am. Please check in at 7:45 am.   **YOU WILL NEED SOMEONE TO DRIVE YOU HOME AFTER PROCEDURE. 2. Please go to [facility] [place] in the outpatient parking lot that is located around to the back of the hospital and enter through the [facility] building. Once you enter through the [facility] check in with the  there. The  will either give you directions or assist you in getting to the cath holding area. 3. You are not to eat or drink anything after midnight the night before your procedure. Small sips of water to take your medications is ok. 4. If you are diabetic, do not take your insulin/sugar pill the morning of the procedure. 5. MEDICATION INSTRUCTIONS:   Please take your morning medications with the following special instructions:      [x]          Please make sure to take your Blood pressure medication : amlodipine, toprol, lisinopril. [x]          Take your Aspirin and/or Plavix/Brilinta. [x]          Stop your Glucophage (Metformin) 48 hrs prior to procedure and do not resume it until after you have the blood work done. 6. We encourage families to wait in the waiting room on the first floor while the procedure is being done. The Doctor will come out and talk with you as soon as the procedure is over.      7. There is the possibility that you may spend the night in the hospital, depending on the results of the procedure. This will be determined after the procedure is done. If angioplasty or stent is planned, you will stay at least one day. 8. If you or your family have any questions, please call our office Monday Friday, 9:00 a. m.4:30 p.m.,  At [phone], and ask to speak to one of the nurses.        Sincerely,      Jorge A Novak MD

## 2022-04-22 DIAGNOSIS — R94.39 ABNORMAL CARDIOVASCULAR STRESS TEST: ICD-10-CM

## 2022-04-22 DIAGNOSIS — I21.3 ST ELEVATION MYOCARDIAL INFARCTION (STEMI), UNSPECIFIED ARTERY (HCC): Primary | ICD-10-CM

## 2022-04-22 DIAGNOSIS — Z01.818 PRE-OP TESTING: ICD-10-CM

## 2022-04-22 DIAGNOSIS — I24.9 ACS (ACUTE CORONARY SYNDROME) (HCC): ICD-10-CM

## 2022-04-22 DIAGNOSIS — Z95.5 STATUS POST INSERTION OF DRUG ELUTING CORONARY ARTERY STENT: ICD-10-CM

## 2022-04-22 RX ORDER — ASPIRIN 325 MG
325 TABLET ORAL DAILY
OUTPATIENT
Start: 2022-04-23

## 2022-04-22 RX ORDER — SODIUM CHLORIDE 0.9 % (FLUSH) 0.9 %
5-40 SYRINGE (ML) INJECTION AS NEEDED
OUTPATIENT
Start: 2022-04-22

## 2022-04-22 RX ORDER — SODIUM CHLORIDE 9 MG/ML
1000 INJECTION, SOLUTION INTRAVENOUS CONTINUOUS
OUTPATIENT
Start: 2022-04-22

## 2022-04-22 RX ORDER — SODIUM CHLORIDE 0.9 % (FLUSH) 0.9 %
5-40 SYRINGE (ML) INJECTION EVERY 8 HOURS
OUTPATIENT
Start: 2022-04-22

## 2022-04-22 NOTE — TELEPHONE ENCOUNTER
Contacted pt at [facility] number. Two patient Identifiers confirmed. Advised pt per Dr William Olguin. Pt verbalized understanding and will come into office May 2, 2022 to  instructions.

## 2022-04-27 DIAGNOSIS — R07.9 CHEST PAIN, UNSPECIFIED TYPE: Primary | ICD-10-CM

## 2022-04-27 DIAGNOSIS — R06.02 SOB (SHORTNESS OF BREATH): ICD-10-CM

## 2022-04-27 NOTE — PROGRESS NOTES
Subjective:      Steve Alvarenga is in the office today for cardiac evaluation. He is a 81-year-old man that was hospitalized at [facility] in early July 2021 for with chest pain and ECG changes consistent with a STEMI. He was brought emergently to the cardiac catheterization lab where he had stenting of the mid RCA. The patient has noticed that over the last couple of weeks, he has had some episodic chest discomfort. It was different from his STEMI pain. He localized the discomfort to the right peristernal area. At times, it would last throughout the day. At the time of his STEMI, he had a sensation that someone was \"kneeling on my chest \". A nuclear stress test was done on 8/9/2021. There was a defect that was small to medium in size present in the mid apical inferior location that was reversible. There appear to be very small on review. In the office today, the patient again reports right peristernal to right precordial chest discomfort. He says that \"something is not right\". He has been having this pain intermittently for several weeks. It generally does not last long. He also reports  dyspnea on exertion which occurs after walking approximately 100 yards. Patient's cardiac risk factors are remote smoking/ tobacco exposure, dyslipidemia, diabetes mellitus, hypertension. Patient Active Problem List    Diagnosis Date Noted    Status post insertion of drug eluting coronary artery stent 08/16/2021    Essential hypertension 08/16/2021    Controlled type 2 diabetes mellitus with complication, without long-term current use of insulin ([facility] 08/16/2021    Dyslipidemia 08/16/2021    Quit using tobacco in remote past 08/16/2021    ACS (acute coronary syndrome) ([facility] 07/06/2021     Current Outpatient Medications   Medication Sig Dispense Refill    rosuvastatin (CRESTOR) 20 mg tablet Take 1 Tablet by mouth nightly.  30 Tablet 0    ticagrelor (BRILINTA) 90 mg tablet Take 1 Tablet by mouth two (2) times a day. 60 Tablet 0    lisinopril (PRINIVIL, ZESTRIL) 40 mg tablet Take 40 mg by mouth daily.  glimepiride (AMARYL) 4 mg tablet Take 4 mg by mouth two (2) times a day.  empagliflozin (Jardiance) 25 mg tablet Take 25 mg by mouth daily.  metFORMIN (GLUCOPHAGE) 500 mg tablet Take 500 mg by mouth daily (with dinner).  colchicine (MITIGARE) 0.6 mg capsule Take 0.6 mg by mouth daily.  aspirin delayed-release 81 mg tablet Take 81 mg by mouth daily.  amLODIPine (NORVASC) 5 mg tablet Take 1 Tablet by mouth daily. 90 Tablet 3    metoprolol succinate (TOPROL-XL) 25 mg XL tablet Take 1 Tablet by mouth two (2) times a day. 60 Tablet 5     Allergies   Allergen Reactions    Hydrocodone-Acetaminophen Other (comments)     No past medical history on file. No past surgical history on file. No family history on file. Social History     Tobacco Use   Smoking Status Not on file   Smokeless Tobacco Never Used          Review of Systems, additional:  Constitutional: negative  Eyes: negative  Respiratory: negative  Cardiovascular: positive for chest pain  Gastrointestinal: negative  Musculoskeletal:negative  Neurological: negative  Behvioral/Psych: negative  Endocrine: negative  ENT: negative    Objective:     Visit Vitals  /64   Pulse 68   Temp 97.9 °F (36.6 °C) (Temporal)   Wt 100.7 kg (222 lb)   SpO2 99%   BMI 30.96 kg/m²     General:  alert, cooperative, no distress   Chest Wall: inspection normal - no chest wall deformities or tenderness, respiratory effort normal   Lung: clear to auscultation bilaterally   Heart:  normal rate and regular rhythm, S1 and S2 normal, no murmurs noted, no gallops noted, no JVD   Abdomen: soft, non-tender. Bowel sounds normal. No masses,  no organomegaly   Extremities: extremities normal, atraumatic, no cyanosis or edema Skin: no rashes   Neuro: alert, oriented, normal speech, no focal findings or movement disorder noted     EK2021. Sinus rhythm.   Cannot exclude prior inferior wall myocardial infarction. Assessment/Plan:       ICD-10-CM ICD-9-CM    1. ACS (acute coronary syndrome) ([facility])  I24.9 411.1 NUCLEAR CARDIAC STRESS TEST   2. ST elevation myocardial infarction (STEMI), unspecified artery ([facility])  I21.3 410.90 NUCLEAR CARDIAC STRESS TEST   3. Status post insertion of drug eluting coronary artery stent , 7/6/2021, RCA. Patient has had some ongoing right peristernal chest discomfort which lasts \"throughout the day \"at times. He reports it is different from his pain at the time of his ACS. Ordered a nuclear stress test.  The nuclear stress test was reviewed. There are equivocal findings. Will increase metoprolol to 50 mg daily. We will reduce amlodipine to 5 mg daily. Prescribed nitroglycerin sublingual and instructed him in its use. Return in 6 weeks. Instructed to call if  symptoms worsen or persist Z95.5 V45.82    4. Quit using tobacco in remote past , none since 2017. Z91.89 V15.89    5. Dyslipidemia , Crestor 20 mg daily. Will need lipid profile early to mid September 2021 E78.5 272.4    6. Controlled type 2 diabetes mellitus with complication, without long-term current use of insulin ([facility])  E11.8 250.90    7. Essential hypertension , controlled in the office today.  I10 401.9

## 2022-05-02 DIAGNOSIS — R07.9 CHEST PAIN, UNSPECIFIED TYPE: Primary | ICD-10-CM

## 2022-05-02 DIAGNOSIS — R06.02 SOB (SHORTNESS OF BREATH): ICD-10-CM

## 2022-05-09 ENCOUNTER — TELEPHONE (OUTPATIENT)
Dept: CARDIOLOGY CLINIC | Age: 62
End: 2022-05-09

## 2022-06-01 ENCOUNTER — OFFICE VISIT (OUTPATIENT)
Dept: CARDIOLOGY CLINIC | Age: 62
End: 2022-06-01
Payer: COMMERCIAL

## 2022-06-01 VITALS
RESPIRATION RATE: 16 BRPM | BODY MASS INDEX: 31.1 KG/M2 | HEART RATE: 66 BPM | WEIGHT: 223 LBS | DIASTOLIC BLOOD PRESSURE: 68 MMHG | TEMPERATURE: 98 F | OXYGEN SATURATION: 98 % | SYSTOLIC BLOOD PRESSURE: 118 MMHG

## 2022-06-01 DIAGNOSIS — R07.89 OTHER CHEST PAIN: Primary | ICD-10-CM

## 2022-06-01 PROCEDURE — 99214 OFFICE O/P EST MOD 30 MIN: CPT | Performed by: INTERNAL MEDICINE

## 2022-06-01 NOTE — PROGRESS NOTES
Identified pt with two pt identifiers(name and ). Reviewed record in preparation for visit and have obtained necessary documentation. ScaleIOanne marie Kitchen presents today for   Chief Complaint   Patient presents with    Follow-up       Pt c/o DIZZINESS, SOB, CHEST PAIN/ PRESSURE, FATIGUE/WEAKNESS, HEADACHES, SWELLING. BeLocal Kitchen preferred language for health care discussion is english/other. Personal Protective Equipment:   Personal Protective Equipment was used including: mask-surgical and hands-gloves. Patient was placed on no precaution(s). Patient was masked. Precautions:   Patient currently on None  Patient currently roomed with door closed. Is someone accompanying this pt? no    Is the patient using any DME equipment during [address]? no    Depression Screening:  3 most recent PHQ Screens 2022   Little interest or pleasure in doing things Not at all   Feeling down, depressed, irritable, or hopeless Several days   Total Score PHQ 2 1       Learning Assessment:  No flowsheet data found. Abuse Screening:  No flowsheet data found. Fall Risk  No flowsheet data found. Pt currently taking Anticoagulant therapy? no  Pt currently taking Antiplatelet therapy? no    Coordination of Care:  1. Have you been to the ER, urgent care clinic since your last visit? Hospitalized since your last visit? no    2. Have you seen or consulted any other health care providers outside of the [address] [place] [address] since your last visit? Include any pap smears or colon screening. no      Please see Red banners under Allergies and Med Rec to remove outside inquires. All correct information has been verified with patient and added to chart.      Medication's patient's would liked removed has been marked not taking to be removed per Verbal order and read back per Khoa Maloney MD

## 2022-06-02 NOTE — TELEPHONE ENCOUNTER
PCP: Vania Hairston MD    Last appt: 6/1/2022  No future appointments.     Requested Prescriptions      No prescriptions requested or ordered in this encounter

## 2022-06-02 NOTE — PATIENT INSTRUCTIONS
New Medication/Medication Changes  Start Nitro 0.4 mg Sublingual as needed     **please allow 24-48 hrs for medication to be escribed to pharmacy** If you need any refills on medications please contact your pharmacy so that the request can be escribed to the provider for review.

## 2022-06-03 RX ORDER — NITROGLYCERIN 0.4 MG/1
0.4 TABLET SUBLINGUAL
Qty: 25 TABLET | Refills: 5 | Status: SHIPPED | OUTPATIENT
Start: 2022-06-03

## 2022-06-04 NOTE — PROGRESS NOTES
Subjective:      Linda Jama is in the office today for cardiac reevaluation. He is a 52-year-old man that was hospitalized at [facility] in early July 2021 for with chest pain and ECG changes consistent with a STEMI. He was brought emergently to the cardiac catheterization lab where he had stenting of the mid RCA. At the time of his last visit in April, the patient reported that over the prior couple of weeks, he had  episodic chest discomfort. It was different from his STEMI pain. He localized the discomfort to the right peristernal area. At times, it would last throughout the day. At the time of his STEMI, he had a sensation that someone was \"kneeling on my chest \". A nuclear stress test was done on 8/9/2021. There was a defect that was small to medium in size present in the mid apical inferior location that was reversible. There appear to be very small on review. In the office today, the patient reports that he is doing \"all right \". He will has occasional right peristernal chest discomfort. He also has dyspnea on exertion. He does believe he could walk a block if he \"paced himself \". He has had no prolonged chest discomfort. He has had no nocturnal chest discomfort. The chest discomfort is not worsening in any degree. Patient's cardiac risk factors are remote smoking/ tobacco exposure, dyslipidemia, diabetes mellitus, hypertension. Patient Active Problem List    Diagnosis Date Noted    Status post insertion of drug eluting coronary artery stent 08/16/2021    Essential hypertension 08/16/2021    Controlled type 2 diabetes mellitus with complication, without long-term current use of insulin ([address] 08/16/2021    Dyslipidemia 08/16/2021    Quit using tobacco in remote past 08/16/2021    ACS (acute coronary syndrome) ([address] 07/06/2021     Current Outpatient Medications   Medication Sig Dispense Refill    SITagliptin (Januvia) 100 mg tablet Take 100 mg by mouth daily.       amLODIPine (NORVASC) 5 mg tablet Take 1 Tablet by mouth daily. 90 Tablet 3    metoprolol succinate (TOPROL-XL) 25 mg XL tablet Take 1 Tablet by mouth two (2) times a day. 60 Tablet 5    rosuvastatin (CRESTOR) 20 mg tablet Take 1 Tablet by mouth nightly. 30 Tablet 0    ticagrelor (BRILINTA) 90 mg tablet Take 1 Tablet by mouth two (2) times a day. 60 Tablet 0    lisinopril (PRINIVIL, ZESTRIL) 40 mg tablet Take 40 mg by mouth daily.  glimepiride (AMARYL) 4 mg tablet Take 4 mg by mouth two (2) times a day.  empagliflozin (Jardiance) 25 mg tablet Take 25 mg by mouth daily.  metFORMIN (GLUCOPHAGE) 500 mg tablet Take 500 mg by mouth daily (with dinner).  colchicine (MITIGARE) 0.6 mg capsule Take 0.6 mg by mouth daily.  aspirin delayed-release 81 mg tablet Take 81 mg by mouth daily.  nitroglycerin (NITROSTAT) 0.4 mg SL tablet 1 Tablet by SubLINGual route every five (5) minutes as needed for Chest Pain. Up to 3 doses. 25 Tablet 5     Allergies   Allergen Reactions    Hydrocodone-Acetaminophen Other (comments)    Stings [Sting, Bee] Unknown (comments)     No past medical history on file. No past surgical history on file. No family history on file.   Social History     Tobacco Use   Smoking Status Unknown If Ever Smoked   Smokeless Tobacco Never Used          Review of Systems, additional:  Constitutional: negative  Eyes: negative  Respiratory: negative  Cardiovascular: positive for chest pain  Gastrointestinal: negative  Musculoskeletal:negative  Neurological: negative  Behvioral/Psych: negative  Endocrine: negative  ENT: negative    Objective:     Visit Vitals  /68 (BP 1 Location: Left upper arm, BP Patient Position: Sitting, BP Cuff Size: Large adult)   Pulse 66   Temp 98 °F (36.7 °C) (Temporal)   Resp 16   Wt 101.2 kg (223 lb)   SpO2 98%   BMI 31.10 kg/m²     General:  alert, cooperative, no distress   Chest Wall: inspection normal - no chest wall deformities or tenderness, respiratory effort normal   Lung: clear to auscultation bilaterally   Heart:  normal rate and regular rhythm, S1 and S2 normal, no murmurs noted, no gallops noted, no JVD   Abdomen: soft, non-tender. Bowel sounds normal. No masses,  no organomegaly   Extremities: extremities normal, atraumatic, no cyanosis or edema Skin: no rashes   Neuro: alert, oriented, normal speech, no focal findings or movement disorder noted     EK2021. Sinus rhythm. Cannot exclude prior inferior wall myocardial infarction. Assessment/Plan:       ICD-10-CM ICD-9-CM    1. ACS (acute coronary syndrome) ([facility])  I24.9 411.1 NUCLEAR CARDIAC STRESS TEST   2. ST elevation myocardial infarction (STEMI), unspecified artery ([facility])  I21.3 410.90 NUCLEAR CARDIAC STRESS TEST   3. Status post insertion of drug eluting coronary artery stent , 2021, RCA. Patient has had  ongoing right peristernal chest discomfort which lasts \"throughout the day \"at times. He reported it was  different from his pain at the time of his ACS. Marquis Ely MONTIEL nuclear stress test was repeated on 5/10/2022. The nuclear stress test was reviewed. There was normal LV perfusion. There was no evidence for inducible ischemia. EF was 67%. Is felt to be a low risk test.     Return in 3 months  Z95.5 V45.82    4. Quit using tobacco in remote past , none since 2017. Z91.89 V15.89    5. Dyslipidemia , Crestor 20 mg daily. E78.5 272.4    6. Controlled type 2 diabetes mellitus with complication, without long-term current use of insulin ([facility])  E11.8 250.90    7. Essential hypertension , controlled in the office today.  I10 401.9

## 2022-08-23 ENCOUNTER — TELEPHONE (OUTPATIENT)
Dept: CARDIOLOGY CLINIC | Age: 62
End: 2022-08-23

## 2022-08-23 NOTE — TELEPHONE ENCOUNTER
Patient needs letter clearing him for work. Patient had similar letter written up last year and needs a similar letter sent to his employer. Patient dropped off paper work at [facility] and states that he was told it would get faxed to our office. Patient also had  staff fax his testing to [phone]. However patient states it was not received.  Informed patient that the [employer] office will send over form to our office here at [place] [id number] and we will work on getting his clearance letter

## 2022-08-24 DIAGNOSIS — R06.02 SOB (SHORTNESS OF BREATH): Primary | ICD-10-CM

## 2022-08-24 DIAGNOSIS — R07.9 CHEST PAIN, UNSPECIFIED TYPE: ICD-10-CM

## 2022-08-24 NOTE — TELEPHONE ENCOUNTER
Verbal order and read back per Madeline Daley MD  Per DOT provider request pt will need echo completed prior to approval

## 2022-08-24 NOTE — TELEPHONE ENCOUNTER
Contacted pt at [facility] number. Two patient Identifiers confirmed. Advised pt per Dr. Jose Cowan. Pt stated his DOT was to  on 22 and he did not have insurance right now. Advised pt I did not know out of pocket cost for echo but I would consult with echo tech and advise. Pt verbalized understanding and scheduled for 22 for echo. Advised I would consult with echo tech to see if pt could be called if there were  any openings sooner.

## 2022-08-25 ENCOUNTER — TELEPHONE (OUTPATIENT)
Dept: CARDIOLOGY CLINIC | Age: 62
End: 2022-08-25

## 2022-08-25 NOTE — TELEPHONE ENCOUNTER
Contacted pt at [facility] number. Two patient Identifiers confirmed. Advised pt per manager prices for echo range for 700-100. Advised that he could be scheduled today for echo. Pt verbalized understanding.

## 2022-08-25 NOTE — TELEPHONE ENCOUNTER
Contacted Cele Rosenberg. Two patient Identifiers confirmed. Inquired if it was ok for pt to have a limited echo instead of echo complete. PSR stated she would forward info to clinical for review with provider and advise. Will await reponse.

## 2022-08-25 NOTE — TELEPHONE ENCOUNTER
Contacted I and O spoke with lima. Two patient Identifiers confirmed. Inquired if it was ok to do a limited echo. 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 Will advise pt.

## 2022-08-25 NOTE — LETTER
NOTIFICATION RETURN TO WORK / SCHOOL    8/25/2022 12:48 PM    Mr. Kehinde Swartz  [address]      To Whom It May Concern:    Kehinde Swartz is currently under the care of CARDIO SPECIALIST AT [facility]. He will return to work as a  with no restrictions from a cardiac stand point. If there are questions or concerns please have the patient contact our office.         Sincerely,      John Gomes MD

## 2023-04-18 RX ORDER — METOPROLOL SUCCINATE 25 MG/1
25 TABLET, EXTENDED RELEASE ORAL 2 TIMES DAILY
Qty: 60 TABLET | Refills: 0 | OUTPATIENT
Start: 2023-04-18

## 2023-05-25 RX ORDER — AMLODIPINE BESYLATE 5 MG/1
TABLET ORAL
Qty: 90 TABLET | OUTPATIENT
Start: 2023-05-25

## 2023-08-21 RX ORDER — METOPROLOL SUCCINATE 25 MG/1
TABLET, EXTENDED RELEASE ORAL
Qty: 60 TABLET | OUTPATIENT
Start: 2023-08-21

## 2023-08-21 RX ORDER — AMLODIPINE BESYLATE 5 MG/1
TABLET ORAL
Qty: 90 TABLET | OUTPATIENT
Start: 2023-08-21

## (undated) DEVICE — CATH GUID .056IN 6FR 150CM -- GUIDELINER V3

## (undated) DEVICE — CATHETER DIAG AD L100CM DIA6FR STD JUDKINS L 4 POLYUR COR

## (undated) DEVICE — SET FLD ADMIN 3 W STPCOCK FIX FEM L BOR 1IN

## (undated) DEVICE — ANGIOGRAPHIC CATHETER: Brand: EXPO™

## (undated) DEVICE — CATHETER ANGIO 6FR L110CM 145DEG VENT POLYUR PGTL 6 SIDE H

## (undated) DEVICE — TREK CORONARY DILATATION CATHETER 2.50 MM X 15 MM / RAPID-EXCHANGE: Brand: TREK

## (undated) DEVICE — PROCEDURE KIT FLUID MGMT 10 FR CUST MAINFOLD

## (undated) DEVICE — CATHETER GUID 6FR L100CM DIA0.071IN NYL SHFT 3DRC W/O SIDE

## (undated) DEVICE — PRESSURE MONITORING SET: Brand: TRUWAVE

## (undated) DEVICE — INTRODUCER SHTH 6FR CANN L11CM DIL TIP 35MM GRN TUNGSTEN

## (undated) DEVICE — DEVICE INFL W ACCS + HEMSTAS VLV INSRT TOOL AND TORQ BASIX

## (undated) DEVICE — Z DUPLICATE USE 2103554 VALVE HEMOSTATIC BLEEDBK CTRL COPILOT

## (undated) DEVICE — Device: Brand: PROWATER

## (undated) DEVICE — NC TREK CORONARY DILATATION CATHETER 3.0 MM X 12 MM / RAPID-EXCHANGE: Brand: NC TREK

## (undated) DEVICE — PACK PROCEDURE SURG VASC CATH 161 MMC LF